# Patient Record
Sex: FEMALE | Race: WHITE | NOT HISPANIC OR LATINO | Employment: UNEMPLOYED | URBAN - METROPOLITAN AREA
[De-identification: names, ages, dates, MRNs, and addresses within clinical notes are randomized per-mention and may not be internally consistent; named-entity substitution may affect disease eponyms.]

---

## 2017-02-02 DIAGNOSIS — J45.30 MILD PERSISTENT ASTHMA WITHOUT COMPLICATION: Primary | ICD-10-CM

## 2017-02-02 RX ORDER — LORATADINE 10 MG/1
10 TABLET ORAL DAILY
Qty: 30 TABLET | Refills: 11 | Status: SHIPPED | OUTPATIENT
Start: 2017-02-02

## 2017-02-02 NOTE — TELEPHONE ENCOUNTER
Request for medication refill:    Date of last visit at clinic: 12/20/16    Please complete refill if appropriate and CLOSE ENCOUNTER.    Closing the encounter signifies the refill is complete.    If refill has been denied, please complete the smart phrase .smirefuse and route it to the [facility] RN TRIAGE pool to inform the patient and the pharmacy.    Marya Ramirez

## 2017-02-13 DIAGNOSIS — K21.9 GASTROESOPHAGEAL REFLUX DISEASE WITHOUT ESOPHAGITIS: ICD-10-CM

## 2017-02-13 DIAGNOSIS — F33.1 MAJOR DEPRESSIVE DISORDER, RECURRENT EPISODE, MODERATE (H): ICD-10-CM

## 2017-02-13 NOTE — TELEPHONE ENCOUNTER
Request for medication refill:    Date of last visit at clinic: 12-20-16    Please complete refill if appropriate and CLOSE ENCOUNTER.    Closing the encounter signifies the refill is complete.    If refill has been denied, please complete the smart phrase .smirefuse and route it to the [facility] RN TRIAGE pool to inform the patient and the pharmacy.    Nevin Maynard

## 2017-02-14 ENCOUNTER — OFFICE VISIT (OUTPATIENT)
Dept: FAMILY MEDICINE | Facility: CLINIC | Age: 42
End: 2017-02-14

## 2017-02-14 VITALS
SYSTOLIC BLOOD PRESSURE: 125 MMHG | HEART RATE: 84 BPM | TEMPERATURE: 98.5 F | HEIGHT: 68 IN | DIASTOLIC BLOOD PRESSURE: 83 MMHG | BODY MASS INDEX: 43.59 KG/M2 | RESPIRATION RATE: 18 BRPM | WEIGHT: 287.6 LBS | OXYGEN SATURATION: 97 %

## 2017-02-14 DIAGNOSIS — K21.9 GASTROESOPHAGEAL REFLUX DISEASE WITHOUT ESOPHAGITIS: ICD-10-CM

## 2017-02-14 DIAGNOSIS — F33.1 MAJOR DEPRESSIVE DISORDER, RECURRENT EPISODE, MODERATE (H): ICD-10-CM

## 2017-02-14 RX ORDER — OMEPRAZOLE 40 MG/1
40 CAPSULE, DELAYED RELEASE ORAL DAILY
Qty: 30 CAPSULE | Refills: 5 | Status: SHIPPED | OUTPATIENT
Start: 2017-02-14 | End: 2017-11-15

## 2017-02-14 RX ORDER — OMEPRAZOLE 40 MG/1
40 CAPSULE, DELAYED RELEASE ORAL DAILY
Qty: 30 CAPSULE | Refills: 1 | Status: SHIPPED | OUTPATIENT
Start: 2017-02-14 | End: 2017-11-06

## 2017-02-14 RX ORDER — ESCITALOPRAM OXALATE 20 MG/1
20 TABLET ORAL DAILY
Qty: 30 TABLET | Refills: 5 | Status: SHIPPED | OUTPATIENT
Start: 2017-02-14

## 2017-02-14 RX ORDER — ESCITALOPRAM OXALATE 20 MG/1
20 TABLET ORAL DAILY
Qty: 30 TABLET | Refills: 1 | Status: SHIPPED | OUTPATIENT
Start: 2017-02-14

## 2017-02-14 ASSESSMENT — PATIENT HEALTH QUESTIONNAIRE - PHQ9: 5. POOR APPETITE OR OVEREATING: SEVERAL DAYS

## 2017-02-14 ASSESSMENT — ENCOUNTER SYMPTOMS
RHINORRHEA: 0
NAUSEA: 0
CONSTIPATION: 0
NERVOUS/ANXIOUS: 1
WEAKNESS: 0
PALPITATIONS: 0
CHILLS: 0
EYES NEGATIVE: 1
ABDOMINAL PAIN: 0
SORE THROAT: 0
DYSPHORIC MOOD: 0
DIARRHEA: 0
HEADACHES: 0
FEVER: 0
DIZZINESS: 0
SHORTNESS OF BREATH: 0

## 2017-02-14 ASSESSMENT — ANXIETY QUESTIONNAIRES
3. WORRYING TOO MUCH ABOUT DIFFERENT THINGS: NOT AT ALL
GAD7 TOTAL SCORE: 2
1. FEELING NERVOUS, ANXIOUS, OR ON EDGE: SEVERAL DAYS
6. BECOMING EASILY ANNOYED OR IRRITABLE: NOT AT ALL
5. BEING SO RESTLESS THAT IT IS HARD TO SIT STILL: NOT AT ALL
IF YOU CHECKED OFF ANY PROBLEMS ON THIS QUESTIONNAIRE, HOW DIFFICULT HAVE THESE PROBLEMS MADE IT FOR YOU TO DO YOUR WORK, TAKE CARE OF THINGS AT HOME, OR GET ALONG WITH OTHER PEOPLE: NOT DIFFICULT AT ALL
2. NOT BEING ABLE TO STOP OR CONTROL WORRYING: NOT AT ALL
7. FEELING AFRAID AS IF SOMETHING AWFUL MIGHT HAPPEN: NOT AT ALL

## 2017-02-14 NOTE — PATIENT INSTRUCTIONS
Here is the plan from today's visit    1. Gastroesophageal reflux disease without esophagitis  - omeprazole (PRILOSEC) 40 MG capsule; Take 1 capsule (40 mg) by mouth daily 1 a day  Dispense: 30 capsule; Refill: 5    2. Major depressive disorder, recurrent episode, moderate (H)  - escitalopram (LEXAPRO) 20 MG tablet; Take 1 tablet (20 mg) by mouth daily  Dispense: 30 tablet; Refill: 5    Please call or return to clinic if your symptoms don't go away.    Follow up plan  Please make a clinic appointment for follow up with me (TASNEEM ROSALES) in 6 months or sooner if worsening symptoms.     Thank you for coming to [facility] today.  Lab Testing:  **If you had lab testing today and your results are reassuring or normal they will be mailed to you or sent through [employer] within 7 days.   **If the lab tests need quick action we will call you with the results.  The phone number we will call with results is # [phone] (home) [phone] (work). If this is not the best number please call our clinic and change the number.  Medication Refills:  If you need any refills please call your pharmacy and they will contact us.   If you need to  your refill at a new pharmacy, please contact the new pharmacy directly. The new pharmacy will help you get your medications transferred faster.   Scheduling:  If you have any concerns about today's visit or wish to schedule another appointment please call our office during normal business hours [phone] (8-5:00 M-F)  If a referral was made to a [facility] and you don't get a call from central scheduling please call [phone].  If a Mammogram was ordered for you at [facility] call [phone] to schedule or change your appointment.  If you had an XRay/CT/Ultrasound/MRI ordered the number is [phone] to schedule or change your radiology appointment.   Medical Concerns:  If you have urgent medical concerns please call [phone] at  any time of the day.

## 2017-02-14 NOTE — PROGRESS NOTES
Preceptor Attestation:   Patient seen and discussed with the resident. Assessment and plan reviewed with resident and agreed upon.   Supervising Physician:  Racheal Westfall MD  [facility]

## 2017-02-14 NOTE — PROGRESS NOTES
"      HPI:       Mackenzie Felix is a 41 year old who presents for the following  Patient presents with:  Refill Request: pt requesting for more 3 supply of medication      Depression/Anxiety  Follow-Up    Status since last visit: No change    What is going well? 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 Never  Are you able to follow the treatment plan? Yes  Exercise: walking 2-3 days/week for an average of 15-30 minutes     Problem, Medication and Allergy Lists were reviewed and are current.  Patient is an established patient of this clinic.         Review of Systems:   Review of Systems   Constitutional: Negative for chills and fever.   HENT: Negative for congestion, ear pain, rhinorrhea and sore throat.    Eyes: Negative.    Respiratory: Negative for shortness of breath.    Cardiovascular: Negative for chest pain and palpitations.   Gastrointestinal: Negative for abdominal pain, constipation, diarrhea and nausea.   Neurological: Negative for dizziness, weakness and headaches.   Psychiatric/Behavioral: Negative for behavioral problems, dysphoric mood and suicidal ideas. The patient is nervous/anxious (sometimes she becomes anxious).              Physical Exam:     Patient Vitals for the past 24 hrs:   BP Temp Temp src Pulse Resp SpO2 Height Weight   02/14/17 1343 125/83 98.5  F (36.9  C) Oral 84 18 97 % 5' 8\" (172.7 cm) 287 lb 9.6 oz (130.5 kg)     Body mass index is 43.73 " kg/(m^2).  Vitals were reviewed and were normal except elevated BMI in morbid obesity      Physical Exam   Constitutional: She appears well-developed and well-nourished. No distress.   HENT:   Head: Normocephalic and atraumatic.   Eyes: Conjunctivae and EOM are normal.   Cardiovascular: Normal rate, regular rhythm and normal heart sounds.    No murmur heard.  Pulmonary/Chest: Effort normal and breath sounds normal. She has no wheezes. She has no rales.   Abdominal: Soft. She exhibits no distension. There is no tenderness.   Skin: Skin is warm and dry. She is not diaphoretic.   Psychiatric: She has a normal mood and affect. Her behavior is normal.         Results:      Results from the last 24 hoursNo results found for this or any previous visit (from the past 24 hour(s)).  Assessment and Plan     Mackenzie was seen today for refill request.    Diagnoses and all orders for this visit:    Major depressive disorder, recurrent episode, moderate (H)- Controlled  PHQ-9 score of 2 and MICHAEL-7 scores of 2. No suicidal or homicidal ideation. Follow up in 6 months or sooner for worsening or new symptoms.  -     escitalopram (LEXAPRO) 20 MG tablet; Take 1 tablet (20 mg) by mouth daily    Gastroesophageal reflux disease without esophagitis-Controlled  -     omeprazole (PRILOSEC) 40 MG capsule; Take 1 capsule (40 mg) by mouth daily 1 a day    Health maintenance: Patient was in a hurry and did not want to discuss health maintenance. Next appointment should address morbid obesity.    Medications Discontinued During This Encounter   Medication Reason     omeprazole (PRILOSEC) 40 MG capsule Reorder     escitalopram (LEXAPRO) 20 MG tablet Reorder     Options for treatment and follow-up care were reviewed with the patient. Mackenzie Felix  engaged in the decision making process and verbalized understanding of the options discussed and agreed with the final plan.    Mariah Zhang MD

## 2017-02-14 NOTE — MR AVS SNAPSHOT
After Visit Summary   2/14/2017    Mackenzie Felix    MRN: [id number]           Patient Information     Date Of Birth          1975        Visit Information        Provider Department      2/14/2017 1:40 PM Tasneem Rosales MD [facility]        Today's Diagnoses     Gastroesophageal reflux disease without esophagitis        Major depressive disorder, recurrent episode, moderate (H)          Care Instructions    Here is the plan from today's visit    1. Gastroesophageal reflux disease without esophagitis  - omeprazole (PRILOSEC) 40 MG capsule; Take 1 capsule (40 mg) by mouth daily 1 a day  Dispense: 30 capsule; Refill: 5    2. Major depressive disorder, recurrent episode, moderate (H)  - escitalopram (LEXAPRO) 20 MG tablet; Take 1 tablet (20 mg) by mouth daily  Dispense: 30 tablet; Refill: 5    Please call or return to clinic if your symptoms don't go away.    Follow up plan  Please make a clinic appointment for follow up with me (TASNEEM ROSALES) in 6 months or sooner if worsening symptoms.     Thank you for coming to [facility] today.  Lab Testing:  **If you had lab testing today and your results are reassuring or normal they will be mailed to you or sent through [employer] within 7 days.   **If the lab tests need quick action we will call you with the results.  The phone number we will call with results is # [phone] (home) [phone] (work). If this is not the best number please call our clinic and change the number.  Medication Refills:  If you need any refills please call your pharmacy and they will contact us.   If you need to  your refill at a new pharmacy, please contact the new pharmacy directly. The new pharmacy will help you get your medications transferred faster.   Scheduling:  If you have any concerns about today's visit or wish to schedule another appointment please call our office during normal business hours [phone] (8-5:00  M-F)  If a referral was made to a [facility] and you don't get a call from central scheduling please call [phone].  If a Mammogram was ordered for you at [facility] call [phone] to schedule or change your appointment.  If you had an XRay/CT/Ultrasound/MRI ordered the number is [phone] to schedule or change your radiology appointment.   Medical Concerns:  If you have urgent medical concerns please call [phone] at any time of the day.          Follow-ups after your visit        Who to contact     Please call your clinic at [phone] to:    Ask questions about your health    Make or cancel appointments    Discuss your medicines    Learn about your test results    Speak to your doctor   If you have compliments or concerns about an experience at your clinic, or if you wish to file a complaint, please contact [facility] Patient Relations at [phone] or email us at [email or web address]         Additional Information About Your Visit        [employer] Information     [employer] is an electronic gateway that provides easy, online access to your medical records. With [employer], you can request a clinic appointment, read your test results, renew a prescription or communicate with your care team.     To sign up for [employer] visit the website at [email or web address]   You will be asked to enter the access code listed below, as well as some personal information. Please follow the directions to create your username and password.     Your access code is: [email or web address]  Expires: 3/28/2017  6:30 AM     Your access code will  in 90 days. If you need help or a new code, please contact your [facility] or call [phone] for assistance.        Care EveryWhere ID     This is your Care EveryWhere ID. This could be used by other organizations to access your [place] medical records  [id number]        Your  "Vitals Were     Pulse Temperature Respirations Height Pulse Oximetry BMI (Body Mass Index)    84 98.5  F (36.9  C) (Oral) 18 5' 8\" (172.7 cm) 97% 43.73 kg/m2       Blood Pressure from Last 3 Encounters:   02/14/17 125/83   12/20/16 118/80   10/14/16 113/75    Weight from Last 3 Encounters:   02/14/17 287 lb 9.6 oz (130.5 kg)   12/20/16 282 lb 6.4 oz (128.1 kg)   10/14/16 275 lb 4 oz (124.9 kg)              Today, you had the following     No orders found for display         Where to get your medicines      These medications [place] [facility] [place] [address]  [address], [place] [address]     Phone:  [phone]     escitalopram 20 MG tablet    omeprazole 40 MG capsule          Primary Care Provider Office Phone # Fax #    Donte Meraz,  [phone]       [facility] [address] [facility] [address]        Thank you!     Thank you for choosing [facility]  for your care. Our goal is always to provide you with excellent care. Hearing back from our patients is one way we can continue to improve our services. Please take a few minutes to complete the written survey that you may receive in the mail after your visit with us. Thank you!             Your Updated Medication List - Protect others around you: Learn how to safely use, store and throw away your medicines at www.disposemymeds.org.          This list is accurate as of: 2/14/17  2:00 PM.  Always use your most recent med list.                   Brand Name Dispense Instructions for use    ACETAMINOPHEN PO      Take 500 Act by mouth       albuterol 108 (90 BASE) MCG/ACT Inhaler    PROAIR HFA/PROVENTIL HFA/VENTOLIN HFA    1 Inhaler    Inhale 2 puffs into the lungs every 4 hours as needed for shortness of breath / dyspnea or wheezing       B-12 1000 MCG Subl      Place 1,000 mcg under the tongue daily Place 1 tab under tongue and dissolve daily       blood glucose " monitoring test strip    no brand specified    200 each    Use to test blood sugars 3 times daily or as directed.       calcium carbonate 500 MG chewable tablet    TUMS     Take 1 chew tab by mouth daily       clotrimazole 1 % cream    LOTRIMIN     Apply topically 2 times daily       escitalopram 20 MG tablet    LEXAPRO    30 tablet    Take 1 tablet (20 mg) by mouth daily       glucose 4 G Chew chewable tablet      Take 4 tablets by mouth as needed       hydrocortisone 1 % cream    CORTAID     Apply 1 drop topically 2 times daily       HYDROXYZINE HCL PO      Take 1 capsule by mouth 1 cap a day       loratadine 10 MG tablet    CLARITIN    30 tablet    Take 1 tablet (10 mg) by mouth daily       MEDROXYPROGESTERONE ACETATE PO      Take 1 Dose by mouth every 3 months       MELATONIN PO      Take 10 mg by mouth At Bedtime       NONFORMULARY      14 mg daily Health Sinus PE & Allergy       omeprazole 40 MG capsule    priLOSEC    30 capsule    Take 1 capsule (40 mg) by mouth daily 1 a day       ORAJEL MT          VITAMIN D (CHOLECALCIFEROL) PO      Take 1,000 Units by mouth daily Take 5 tabs by mouth daily

## 2017-02-15 ASSESSMENT — ANXIETY QUESTIONNAIRES: GAD7 TOTAL SCORE: 2

## 2017-02-15 ASSESSMENT — PATIENT HEALTH QUESTIONNAIRE - PHQ9: SUM OF ALL RESPONSES TO PHQ QUESTIONS 1-9: 2

## 2017-03-27 ENCOUNTER — OFFICE VISIT (OUTPATIENT)
Dept: FAMILY MEDICINE | Facility: CLINIC | Age: 42
End: 2017-03-27

## 2017-03-27 VITALS
TEMPERATURE: 98.1 F | OXYGEN SATURATION: 96 % | HEART RATE: 98 BPM | WEIGHT: 282 LBS | RESPIRATION RATE: 18 BRPM | SYSTOLIC BLOOD PRESSURE: 127 MMHG | DIASTOLIC BLOOD PRESSURE: 84 MMHG | BODY MASS INDEX: 42.88 KG/M2

## 2017-03-27 DIAGNOSIS — E16.2 HYPOGLYCEMIA: Primary | ICD-10-CM

## 2017-03-27 NOTE — MR AVS SNAPSHOT
After Visit Summary   3/27/2017    Mackenzie Felix    MRN: [id number]           Patient Information     Date Of Birth          1975        Visit Information        Provider Department      3/27/2017 2:20 PM Donte Meraz DO [facility]         Follow-ups after your visit        Who to contact     Please call your clinic at [phone] to:    Ask questions about your health    Make or cancel appointments    Discuss your medicines    Learn about your test results    Speak to your doctor   If you have compliments or concerns about an experience at your clinic, or if you wish to file a complaint, please contact [facility] Patient Relations at [phone] or email us at [email or web address]         Additional Information About Your Visit        MyChart Information     [employer] is an electronic gateway that provides easy, online access to your medical records. With [employer], you can request a clinic appointment, read your test results, renew a prescription or communicate with your care team.     To sign up for [employer] visit the website at [email or web address]   You will be asked to enter the access code listed below, as well as some personal information. Please follow the directions to create your username and password.     Your access code is: [email or web address]  Expires: 3/28/2017  7:30 AM     Your access code will  in 90 days. If you need help or a new code, please contact your [facility] or call [phone] for assistance.        Care EveryWhere ID     This is your Care EveryWhere ID. This could be used by other organizations to access your [place] medical records  [id number]        Your Vitals Were     Pulse Temperature Respirations Pulse Oximetry Breastfeeding? BMI (Body Mass Index)    98 98.1  F (36.7  C) (Oral) 18 96% No 42.88 kg/m2       Blood Pressure from Last 3 Encounters:   17  127/84   02/14/17 125/83   12/20/16 118/80    Weight from Last 3 Encounters:   03/27/17 282 lb (127.9 kg)   02/14/17 287 lb 9.6 oz (130.5 kg)   12/20/16 282 lb 6.4 oz (128.1 kg)              Today, you had the following     No orders found for display       Primary Care Provider Office Phone # Fax #    Donte Meraz [phone]       [facility] [address] [facility] [address]        Thank you!     Thank you for choosing [facility]  for your care. Our goal is always to provide you with excellent care. Hearing back from our patients is one way we can continue to improve our services. Please take a few minutes to complete the written survey that you may receive in the mail after your visit with us. Thank you!             Your Updated Medication List - Protect others around you: Learn how to safely use, store and throw away your medicines at www.disposemymeds.org.          This list is accurate as of: 3/27/17  2:45 PM.  Always use your most recent med list.                   Brand Name Dispense Instructions for use    ACETAMINOPHEN PO      Take 500 Act by mouth       albuterol 108 (90 BASE) MCG/ACT Inhaler    PROAIR HFA/PROVENTIL HFA/VENTOLIN HFA    1 Inhaler    Inhale 2 puffs into the lungs every 4 hours as needed for shortness of breath / dyspnea or wheezing       B-12 1000 MCG Subl      Place 1,000 mcg under the tongue daily Place 1 tab under tongue and dissolve daily       blood glucose monitoring test strip    no brand specified    200 each    Use to test blood sugars 3 times daily or as directed.       calcium carbonate 500 MG chewable tablet    TUMS     Take 1 chew tab by mouth daily       clotrimazole 1 % cream    LOTRIMIN     Apply topically 2 times daily       * escitalopram 20 MG tablet    LEXAPRO    30 tablet    Take 1 tablet (20 mg) by mouth daily       * escitalopram 20 MG tablet    LEXAPRO    30 tablet    Take 1 tablet (20 mg) by mouth daily       glucose 4  G Chew chewable tablet      Take 4 tablets by mouth as needed       hydrocortisone 1 % cream    CORTAID     Apply 1 drop topically 2 times daily       HYDROXYZINE HCL PO      Take 1 capsule by mouth 1 cap a day       loratadine 10 MG tablet    CLARITIN    30 tablet    Take 1 tablet (10 mg) by mouth daily       MEDROXYPROGESTERONE ACETATE PO      Take 1 Dose by mouth every 3 months       MELATONIN PO      Take 10 mg by mouth At Bedtime       NONFORMULARY      14 mg daily Health Sinus PE & Allergy       * omeprazole 40 MG capsule    priLOSEC    30 capsule    Take 1 capsule (40 mg) by mouth daily 1 a day       * omeprazole 40 MG capsule    priLOSEC    30 capsule    Take 1 capsule (40 mg) by mouth daily 1 a day       ORAJEL MT          VITAMIN D (CHOLECALCIFEROL) PO      Take 1,000 Units by mouth daily Take 5 tabs by mouth daily       * Notice:  This list has 4 medication(s) that are the same as other medications prescribed for you. Read the directions carefully, and ask your doctor or other care provider to review them with you.

## 2017-03-27 NOTE — PROGRESS NOTES
HPI:       Mackenzie Felix is a 41 year old who presents for the following  Patient presents with:  Forms    Has not been having hypoglycemia in several months.  She has a history of taking     She is currently only checking her blood sugar when she is having symptoms. Has not needed to chevck in 6 weeks. Last time she checked it was 53 before lunch. She reports being symptomatic at the time and did not pass out because she took some glucose tabs.  The problem, per endocrinology has been low blood sugar caused by a spike in insulin after a carb heavy meal s/p gastric bypass.  Does not have problems after eating a carb light/ protein heavy meal.        Problem, Medication and Allergy Lists were reviewed and are current.  Patient is an established patient of this clinic.         Review of Systems:   Review of Systems   Constitutional: Negative for chills and fever.   Respiratory: Negative for shortness of breath.    Cardiovascular: Negative for chest pain.   Gastrointestinal: Negative for constipation, diarrhea, nausea and vomiting.   Neurological: Negative for dizziness, light-headedness and headaches.   Psychiatric/Behavioral: Negative for dysphoric mood.             Physical Exam:   Patient Vitals for the past 24 hrs:   BP Temp Temp src Pulse Resp SpO2 Weight   03/27/17 1418 127/84 98.1  F (36.7  C) Oral 98 18 96 % 282 lb (127.9 kg)     Body mass index is 42.88 kg/(m^2).  Vitals were reviewed and were normal     Physical Exam   Constitutional: She appears well-developed.   HENT:   Head: Normocephalic and atraumatic.   Eyes: Conjunctivae are normal.   Pulmonary/Chest: No respiratory distress.   Skin: Skin is warm and dry. No erythema.   Psychiatric: She has a normal mood and affect. Her behavior is normal. Thought content normal.         Results:     No investigations ordered today    Assessment and Plan     1. Hypoglycemia  Pt needs a form for DMV.  She has made adaptations to her diet and does not drink  anymore.  She is in long term treatment and will continue in the program for the foreseeable future.  She has glucose tabs with her at all times. Hypoglycemia is caused from high carb meal in morning with a history of gastric bypass surgery.      There are no discontinued medications.  Options for treatment and follow-up care were reviewed with the patient. Mackenzie e[employer]ix  engaged in the decision making process and verbalized understanding of the options discussed and agreed with the final plan.    Donte Meraz, DO

## 2017-03-27 NOTE — PROGRESS NOTES
Preceptor Attestation:   Patient seen and discussed with the resident.   Assessment and plan reviewed with resident and agreed upon.   Supervising Physician:  Nickolas Nelson MD  [facility]

## 2017-03-31 ASSESSMENT — ENCOUNTER SYMPTOMS
SHORTNESS OF BREATH: 0
NAUSEA: 0
DYSPHORIC MOOD: 0
CONSTIPATION: 0
VOMITING: 0
CHILLS: 0
DIARRHEA: 0
LIGHT-HEADEDNESS: 0
FEVER: 0
HEADACHES: 0
DIZZINESS: 0

## 2017-06-06 DIAGNOSIS — J18.9 PNEUMONIA DUE TO INFECTIOUS ORGANISM, UNSPECIFIED LATERALITY, UNSPECIFIED PART OF LUNG: ICD-10-CM

## 2017-06-06 NOTE — TELEPHONE ENCOUNTER
PATIENT IS OUT OF MEDICATION    [facility] phone call message- patient requesting a refill:    Full Medication Name: albuterol (PROAIR HFA, PROVENTIL HFA, VENTOLIN HFA) 108 (90 BASE) MCG/ACT inhaler        Pharmacy confirmed as     [facility] - [address] [address]  Phone: [phone] Fax: [phone]  : Yes    Additional Comments: Patient is out of medication and states that she really needs this medication due to the weather.      OK to leave a message on voice mail? Yes    Primary language: English      needed? No    Call taken on June 6, 2017 at 9:30 AM by Nadine Pozo

## 2017-06-06 NOTE — TELEPHONE ENCOUNTER
Message routed to PCP at high priority to refill if appropriate. Last office visit 3/27/17    Kristen Ferrer RN

## 2017-06-07 NOTE — TELEPHONE ENCOUNTER
PT returned call.     Pt sts she has been having more difficulty breathing due to the seasonal changes, but does not feel like she is in overwhelming distress. Pt was speaking evenly in full sentences and does not appear to be in respiratory distress. I advised Pt that she should make an appointment to come into the clinic to ensure that our treatment plan is working for her since she has had to use her inhaler more frequently. I also advise that we would continue to wait for Dr. Meraz to authorize the refill and that if she felt like she couldn't wait any longer, then she should go to the ER. Pt agrees and was transferred to the  for scheduling.     Deny Vieira RN

## 2017-06-07 NOTE — TELEPHONE ENCOUNTER
Patient is calling to check on the status. She states she is almost out and really needs her inhaler.

## 2017-06-07 NOTE — TELEPHONE ENCOUNTER
Attempted to call patient to discuss symptoms and determine if visit is need to adjust controller medications to reduce reliance on rescue inhaler. No answer, message left to return call.     Routing to RN Triage Pool to follow up.     Deny Vieira RN

## 2017-06-09 RX ORDER — ALBUTEROL SULFATE 90 UG/1
2 AEROSOL, METERED RESPIRATORY (INHALATION) EVERY 4 HOURS PRN
Qty: 1 INHALER | Refills: 3 | Status: SHIPPED | OUTPATIENT
Start: 2017-06-09

## 2017-06-09 NOTE — TELEPHONE ENCOUNTER
The patient called to get the status of the refill.  I advised not filled yet and will send message again.  Pt is scheduled to see the doctor next week.  Please refill.

## 2017-06-16 ENCOUNTER — OFFICE VISIT (OUTPATIENT)
Dept: FAMILY MEDICINE | Facility: CLINIC | Age: 42
End: 2017-06-16

## 2017-06-16 VITALS
DIASTOLIC BLOOD PRESSURE: 79 MMHG | BODY MASS INDEX: 41.51 KG/M2 | SYSTOLIC BLOOD PRESSURE: 120 MMHG | WEIGHT: 273 LBS | TEMPERATURE: 98.3 F | RESPIRATION RATE: 22 BRPM | OXYGEN SATURATION: 95 % | HEART RATE: 85 BPM

## 2017-06-16 DIAGNOSIS — J45.31 MILD PERSISTENT ALLERGIC ASTHMA WITH ACUTE EXACERBATION: ICD-10-CM

## 2017-06-16 DIAGNOSIS — G47.33 OSA (OBSTRUCTIVE SLEEP APNEA): Primary | ICD-10-CM

## 2017-06-16 RX ORDER — ALBUTEROL SULFATE 90 UG/1
2 AEROSOL, METERED RESPIRATORY (INHALATION) EVERY 6 HOURS PRN
Qty: 1 INHALER | Refills: 3 | Status: SHIPPED | OUTPATIENT
Start: 2017-06-16

## 2017-06-16 RX ORDER — PREDNISONE 20 MG/1
40 TABLET ORAL DAILY
Qty: 10 TABLET | Refills: 0 | Status: SHIPPED | OUTPATIENT
Start: 2017-06-16 | End: 2017-06-21

## 2017-06-16 ASSESSMENT — ENCOUNTER SYMPTOMS
SORE THROAT: 0
NAUSEA: 0
VOMITING: 0
WHEEZING: 1
DIZZINESS: 0
DYSPHORIC MOOD: 0
SHORTNESS OF BREATH: 1
DIFFICULTY URINATING: 0
LIGHT-HEADEDNESS: 0
HEADACHES: 0
COUGH: 1
DIARRHEA: 0
CHEST TIGHTNESS: 1
CHILLS: 0
CONSTIPATION: 0
FEVER: 0

## 2017-06-16 NOTE — MR AVS SNAPSHOT
After Visit Summary   6/16/2017    Mackenzie Felix    MRN: [id number]           Patient Information     Date Of Birth          1975        Visit Information        Provider Department      6/16/2017 1:00 PM Donte Meraz DO [facility]        Today's Diagnoses     BENTON (obstructive sleep apnea)    -  1    Mild persistent allergic asthma with acute exacerbation          Care Instructions    Here is the plan from today's visit    1. BENTON (obstructive sleep apnea)  Go to [employer].  I will give you a list.  If you are having trouble getting one, call our clinic and ask for a care coordinator.   - order for DME; New cpap mask  Dispense: 1 each; Refill: 0    2. Mild persistent allergic asthma with acute exacerbation  - albuterol (PROAIR HFA/PROVENTIL HFA/VENTOLIN HFA) 108 (90 BASE) MCG/ACT Inhaler; Inhale 2 puffs into the lungs every 6 hours as needed for shortness of breath / dyspnea or wheezing  Dispense: 1 Inhaler; Refill: 3  - fluticasone (FLOVENT DISKUS) 250 MCG/BLIST AEPB Inhaler; Inhale 2 puffs into the lungs 2 times daily  Dispense: 3 Inhaler; Refill: 1  - predniSONE (DELTASONE) 20 MG tablet; Take 2 tablets (40 mg) by mouth daily for 5 days  Dispense: 10 tablet; Refill: 0    Plantar fasciitis  Use your insoles as often as possible.  Return in 1 month.        Please call or return to clinic if your symptoms don't go away.    Follow up plan  Return in 1 month     Thank you for coming to [facility] today.  Lab Testing:  **If you had lab testing today and your results are reassuring or normal they will be mailed to you or sent through [employer] within 7 days.   **If the lab tests need quick action we will call you with the results.  The phone number we will call with results is # [phone] (home) [phone] (work). If this is not the best number please call our clinic and change the number.  Medication Refills:  If you need any refills please call your  pharmacy and they will contact us.   If you need to  your refill at a new pharmacy, please contact the new pharmacy directly. The new pharmacy will help you get your medications transferred faster.   Scheduling:  If you have any concerns about today's visit or wish to schedule another appointment please call our office during normal business hours [phone] (8-5:00 M-F)  If a referral was made to a [facility] and you don't get a call from central scheduling please call [phone].  If a Mammogram was ordered for you at [facility] call [phone] to schedule or change your appointment.  If you had an XRay/CT/Ultrasound/MRI ordered the number is [phone] to schedule or change your radiology appointment.   Medical Concerns:  If you have urgent medical concerns please call [phone] at any time of the day.            Follow-ups after your visit        Who to contact     Please call your clinic at [phone] to:    Ask questions about your health    Make or cancel appointments    Discuss your medicines    Learn about your test results    Speak to your doctor   If you have compliments or concerns about an experience at your clinic, or if you wish to file a complaint, please contact [facility] Patient Relations at [phone] or email us at [email or web address]         Additional Information About Your Visit        MyChart Information     [employer] is an electronic gateway that provides easy, online access to your medical records. With [employer], you can request a clinic appointment, read your test results, renew a prescription or communicate with your care team.     To sign up for [employer] visit the website at [email or web address]   You will be asked to enter the access code listed below, as well as some personal information. Please follow the directions to create your username and password.     Your access code is:  [email or web address]  Expires: 2017  1:31 PM     Your access code will  in 90 days. If you need help or a new code, please contact your [facility] or call [phone] for assistance.        Care EveryWhere ID     This is your Care EveryWhere ID. This could be used by other organizations to access your [facility] medical records  [id number]        Your Vitals Were     Pulse Temperature Respirations Pulse Oximetry Breastfeeding? BMI (Body Mass Index)    85 98.3  F (36.8  C) (Oral) 22 95% No 41.51 kg/m2       Blood Pressure from Last 3 Encounters:   17 120/79   17 127/84   17 125/83    Weight from Last 3 Encounters:   17 273 lb (123.8 kg)   17 282 lb (127.9 kg)   17 287 lb 9.6 oz (130.5 kg)              Today, you had the following     No orders found for display         Today's Medication Changes          These changes are accurate as of: 17  1:31 PM.  If you have any questions, ask your nurse or doctor.               Start taking these medicines.        Dose/Directions    fluticasone 250 MCG/BLIST Aepb Inhaler   Commonly known as:  FLOVENT DISKUS   Used for:  Mild persistent allergic asthma with acute exacerbation   Started by:  Donte Meraz DO        Dose:  2 puff   Inhale 2 puffs into the lungs 2 times daily   Quantity:  3 Inhaler   Refills:  1       order for DME   Used for:  BENTON (obstructive sleep apnea)   Started by:  Donte Meraz DO        New cpap mask   Quantity:  1 each   Refills:  0       predniSONE 20 MG tablet   Commonly known as:  DELTASONE   Used for:  Mild persistent allergic asthma with acute exacerbation   Started by:  Donte Meraz DO        Dose:  40 mg   Take 2 tablets (40 mg) by mouth daily for 5 days   Quantity:  10 tablet   Refills:  0         These medicines have changed or have updated prescriptions.        Dose/Directions    * albuterol 108 (90 BASE) MCG/ACT Inhaler   Commonly known as:  PROAIR HFA/PROVENTIL  HFA/VENTOLIN HFA   This may have changed:  Another medication with the same name was added. Make sure you understand how and when to take each.   Used for:  Pneumonia due to infectious organism, unspecified laterality, unspecified part of lung   Changed by:  Donte Meraz DO        Dose:  2 puff   Inhale 2 puffs into the lungs every 4 hours as needed for shortness of breath / dyspnea or wheezing   Quantity:  1 Inhaler   Refills:  3       * albuterol 108 (90 BASE) MCG/ACT Inhaler   Commonly known as:  PROAIR HFA/PROVENTIL HFA/VENTOLIN HFA   This may have changed:  You were already taking a medication with the same name, and this prescription was added. Make sure you understand how and when to take each.   Used for:  Mild persistent allergic asthma with acute exacerbation   Changed by:  Donte Meraz DO        Dose:  2 puff   Inhale 2 puffs into the lungs every 6 hours as needed for shortness of breath / dyspnea or wheezing   Quantity:  1 Inhaler   Refills:  3       * Notice:  This list has 2 medication(s) that are the same as other medications prescribed for you. Read the directions carefully, and ask your doctor or other care provider to review them with you.         Where to get your medicines      These medications were sent to [id number] - [place], [place] - [address] [place] [address] AT [facility] [address]  [address], [place] [address]     Phone:  [phone]     albuterol 108 (90 BASE) MCG/ACT Inhaler    fluticasone 250 MCG/BLIST Aepb Inhaler    predniSONE 20 MG tablet         Some of these will need a paper prescription and others can be bought over the counter.  Ask your nurse if you have questions.     Bring a paper prescription for each of these medications     order for [facility]                Primary Care Provider Office Phone # Fax #    Donte Meraz [phone]       [facility] [address]  [facility] [address]        Thank you!     Thank you for choosing [facility]  for your care. Our goal is always to provide you with excellent care. Hearing back from our patients is one way we can continue to improve our services. Please take a few minutes to complete the written survey that you may receive in the mail after your visit with us. Thank you!             Your Updated Medication List - Protect others around you: Learn how to safely use, store and throw away your medicines at www.disposemymeds.org.          This list is accurate as of: 6/16/17  1:31 PM.  Always use your most recent med list.                   Brand Name Dispense Instructions for use    ACETAMINOPHEN PO      Take 500 Act by mouth       * albuterol 108 (90 BASE) MCG/ACT Inhaler    PROAIR HFA/PROVENTIL HFA/VENTOLIN HFA    1 Inhaler    Inhale 2 puffs into the lungs every 4 hours as needed for shortness of breath / dyspnea or wheezing       * albuterol 108 (90 BASE) MCG/ACT Inhaler    PROAIR HFA/PROVENTIL HFA/VENTOLIN HFA    1 Inhaler    Inhale 2 puffs into the lungs every 6 hours as needed for shortness of breath / dyspnea or wheezing       B-12 1000 MCG Subl      Place 1,000 mcg under the tongue daily Place 1 tab under tongue and dissolve daily       blood glucose monitoring test strip    no brand specified    200 each    Use to test blood sugars 3 times daily or as directed.       calcium carbonate 500 MG chewable tablet    TUMS     Take 1 chew tab by mouth daily       clotrimazole 1 % cream    LOTRIMIN     Apply topically 2 times daily       * escitalopram 20 MG tablet    LEXAPRO    30 tablet    Take 1 tablet (20 mg) by mouth daily       * escitalopram 20 MG tablet    LEXAPRO    30 tablet    Take 1 tablet (20 mg) by mouth daily       fluticasone 250 MCG/BLIST Aepb Inhaler    FLOVENT DISKUS    3 Inhaler    Inhale 2 puffs into the lungs 2 times daily       glucose 4 G Chew chewable tablet      Take 4 tablets by mouth as needed       hydrocortisone 1 % cream    CORTAID     Apply 1 drop topically  2 times daily       HYDROXYZINE HCL PO      Take 1 capsule by mouth 1 cap a day       loratadine 10 MG tablet    CLARITIN    30 tablet    Take 1 tablet (10 mg) by mouth daily       MEDROXYPROGESTERONE ACETATE PO      Take 1 Dose by mouth every 3 months       MELATONIN PO      Take 10 mg by mouth At Bedtime       NONFORMULARY      14 mg daily [facility] All[facility]       * omeprazole 40 MG capsule    priLOSEC    30 capsule    Take 1 capsule (40 mg) by mouth daily 1 a day       * omeprazole 40 MG capsule    priLOSEC    30 capsule    Take 1 capsule (40 mg) by mouth daily 1 a day       ORAJEL MT          order for DME     1 each    New cpap mask       predniSONE 20 MG tablet    DELTASONE    10 tablet    Take 2 tablets (40 mg) by mouth daily for 5 days       VITAMIN D (CHOLECALCIFEROL) PO      Take 1,000 Units by mouth daily Take 5 tabs by mouth daily       * Notice:  This list has 6 medication(s) that are the same as other medications prescribed for you. Read the directions carefully, and ask your doctor or other care provider to review them with you.

## 2017-06-16 NOTE — PROGRESS NOTES
HPI:       Mackenzie Felix is a 42 year old who presents for the following  Patient presents with:  Asthma: Follow up and med refill  Musculoskeletal Problem: Left foot pain     Needs a new mask for her cpap.  Diagnosed 2005.  12 months ago had a sleep study. Needs a new mask.  It tore.      Asthma.  Lives with a lot of smokers now.  Used to have a nebulizer but she doesn't know where it is.  Albuterol rescue inhaler is all she is using.  Has been this way for the last month that she has had really tight breathing.  Working full time and everyone smokes and it is very [place]. Home people smoke.  Moved into new environment a month ago.  Was on advair in the past.  Doesn't remember if it helped. Asmacort in the past as well. No ER visits.      Plantar fasciitis.  Nurse at [facility] thought it was it.  First step is the worse.  Having trouble walking very much. Not wearing supportive shoes right now.  Walks with walking shoes.  Brand new inserts in the shoes.                 Problem, Medication and Allergy Lists were reviewed and are current.  Patient is an established patient of this clinic.         Review of Systems:   Review of Systems   Constitutional: Negative for chills and fever.   HENT: Negative for congestion and sore throat.    Eyes: Negative for visual disturbance.   Respiratory: Positive for cough, chest tightness, shortness of breath and wheezing.    Cardiovascular: Negative for chest pain.   Gastrointestinal: Negative for constipation, diarrhea, nausea and vomiting.   Genitourinary: Negative for difficulty urinating.   Musculoskeletal:        Left foot pain    Skin: Negative for rash.   Neurological: Negative for dizziness, light-headedness and headaches.   Psychiatric/Behavioral: Negative for dysphoric mood.             Physical Exam:   Patient Vitals for the past 24 hrs:   BP Temp Temp src Pulse Resp SpO2 Weight   06/16/17 1304 120/79 98.3  F (36.8  C) Oral 85 22 95 % 273 lb (123.8 kg)      Body mass index is 41.51 kg/(m^2).  Vitals were reviewed and were normal     Physical Exam   Constitutional: She is oriented to person, place, and time. She appears well-developed and well-nourished.   HENT:   Head: Normocephalic and atraumatic.   Eyes: Conjunctivae and EOM are normal.   Cardiovascular: Normal rate, regular rhythm and normal heart sounds.  Exam reveals no gallop and no friction rub.    No murmur heard.  Pulmonary/Chest: Effort normal. No respiratory distress. She has no wheezes. She has no rales.   Tight breath sounds bilaterally, decreased air entry   Abdominal: She exhibits no distension.   Musculoskeletal:   Left plantar fascia tenderness.   Neurological: She is alert and oriented to person, place, and time. No cranial nerve deficit.   Skin: Skin is warm and dry.   Psychiatric: She has a normal mood and affect. Her behavior is normal. Judgment and thought content normal.         Results:     No investigations ordered today    Assessment and Plan     Patient Instructions   Here is the plan from today's visit    1. BENTON (obstructive sleep apnea)  Go to [employer].  I will give you a list.  If you are having trouble getting one, call our clinic and ask for a care coordinator.   - order for DME; New cpap mask  Dispense: 1 each; Refill: 0    2. Mild persistent allergic asthma with acute exacerbation  - albuterol (PROAIR HFA/PROVENTIL HFA/VENTOLIN HFA) 108 (90 BASE) MCG/ACT Inhaler; Inhale 2 puffs into the lungs every 6 hours as needed for shortness of breath / dyspnea or wheezing  Dispense: 1 Inhaler; Refill: 3  - fluticasone (FLOVENT DISKUS) 250 MCG/BLIST AEPB Inhaler; Inhale 2 puffs into the lungs 2 times daily  Dispense: 3 Inhaler; Refill: 1  - predniSONE (DELTASONE) 20 MG tablet; Take 2 tablets (40 mg) by mouth daily for 5 days  Dispense: 10 tablet; Refill: 0    Plantar fasciitis  Use your insoles as often as possible.  Return in 1 month.        Please call or return to clinic if your  symptoms don't go away.    Follow up plan  Return in 1 month     There are no discontinued medications.  Options for treatment and follow-up care were reviewed with the patient. Mackenzie Felix  engaged in the decision making process and verbalized understanding of the options discussed and agreed with the final plan.    Donte Meraz, DO

## 2017-06-16 NOTE — PROGRESS NOTES
Preceptor Attestation:   Patient seen and discussed with the resident. Assessment and plan reviewed with resident and agreed upon.   Supervising Physician:  Noah Rogel MD  [facility]

## 2017-06-16 NOTE — PATIENT INSTRUCTIONS
Here is the plan from today's visit    1. BENTON (obstructive sleep apnea)  Go to [employer].  I will give you a list.  If you are having trouble getting one, call our clinic and ask for a care coordinator.   - order for DME; New cpap mask  Dispense: 1 each; Refill: 0    2. Mild persistent allergic asthma with acute exacerbation  - albuterol (PROAIR HFA/PROVENTIL HFA/VENTOLIN HFA) 108 (90 BASE) MCG/ACT Inhaler; Inhale 2 puffs into the lungs every 6 hours as needed for shortness of breath / dyspnea or wheezing  Dispense: 1 Inhaler; Refill: 3  - fluticasone (FLOVENT DISKUS) 250 MCG/BLIST AEPB Inhaler; Inhale 2 puffs into the lungs 2 times daily  Dispense: 3 Inhaler; Refill: 1  - predniSONE (DELTASONE) 20 MG tablet; Take 2 tablets (40 mg) by mouth daily for 5 days  Dispense: 10 tablet; Refill: 0    Plantar fasciitis  Use your insoles as often as possible.  Return in 1 month.        Please call or return to clinic if your symptoms don't go away.    Follow up plan  Return in 1 month     Thank you for coming to [facility] today.  Lab Testing:  **If you had lab testing today and your results are reassuring or normal they will be mailed to you or sent through [employer] within 7 days.   **If the lab tests need quick action we will call you with the results.  The phone number we will call with results is # [phone] (home) [phone] (work). If this is not the best number please call our clinic and change the number.  Medication Refills:  If you need any refills please call your pharmacy and they will contact us.   If you need to  your refill at a new pharmacy, please contact the new pharmacy directly. The new pharmacy will help you get your medications transferred faster.   Scheduling:  If you have any concerns about today's visit or wish to schedule another appointment please call our office during normal business hours [phone] (8-5:00 M-F)  If a referral was made to a [facility] and you don't get a call from central scheduling please call [phone].  If a Mammogram was ordered for you at [facility] call [phone] to schedule or change your appointment.  If you had an XRay/CT/Ultrasound/MRI ordered the number is [phone] to schedule or change your radiology appointment.   Medical Concerns:  If you have urgent medical concerns please call [phone] at any time of the day.

## 2017-06-22 ASSESSMENT — ASTHMA QUESTIONNAIRES: ACT_TOTALSCORE: 13

## 2017-06-24 ENCOUNTER — HEALTH MAINTENANCE LETTER (OUTPATIENT)
Age: 42
End: 2017-06-24

## 2017-08-07 ENCOUNTER — HOSPITAL ENCOUNTER (OUTPATIENT)
Dept: RADIOLOGY | Facility: CLINIC | Age: 42
Discharge: HOME | End: 2017-08-07
Attending: EMERGENCY MEDICINE

## 2017-08-07 DIAGNOSIS — Z90.3 HISTORY OF SLEEVE GASTRECTOMY: ICD-10-CM

## 2017-08-07 DIAGNOSIS — R63.5 WEIGHT GAIN, ABNORMAL: ICD-10-CM

## 2017-08-07 DIAGNOSIS — K91.2 POSTOPERATIVE MALABSORPTION: ICD-10-CM

## 2017-08-11 ENCOUNTER — TELEPHONE (OUTPATIENT)
Dept: PHARMACY | Facility: CLINIC | Age: 42
End: 2017-08-11

## 2017-08-14 ENCOUNTER — OFFICE VISIT - HEALTHEAST (OUTPATIENT)
Dept: SURGERY | Facility: CLINIC | Age: 42
End: 2017-08-14

## 2017-08-14 DIAGNOSIS — E66.01 OBESITY, CLASS III, BMI 40-49.9 (MORBID OBESITY) (H): ICD-10-CM

## 2017-08-14 DIAGNOSIS — K91.2 POSTOPERATIVE MALABSORPTION: ICD-10-CM

## 2017-08-14 DIAGNOSIS — E55.9 VITAMIN D DEFICIENCY: ICD-10-CM

## 2017-08-14 DIAGNOSIS — R63.5 WEIGHT GAIN: ICD-10-CM

## 2017-08-14 DIAGNOSIS — K90.9 MALABSORPTION: ICD-10-CM

## 2017-08-14 DIAGNOSIS — Z90.3 HISTORY OF SLEEVE GASTRECTOMY: ICD-10-CM

## 2017-08-14 DIAGNOSIS — K44.9 HIATAL HERNIA: ICD-10-CM

## 2017-08-14 DIAGNOSIS — R79.89 LOW VITAMIN B12 LEVEL: ICD-10-CM

## 2017-08-14 DIAGNOSIS — Z87.19 HX OF GASTROESOPHAGEAL REFLUX (GERD): ICD-10-CM

## 2017-08-14 ASSESSMENT — MIFFLIN-ST. JEOR: SCORE: 1899.6

## 2017-08-21 DIAGNOSIS — J45.31 MILD PERSISTENT ALLERGIC ASTHMA WITH ACUTE EXACERBATION: ICD-10-CM

## 2017-08-21 NOTE — TELEPHONE ENCOUNTER
Request for medication refill:    Date of last visit at clinic: 6-16-17    Please complete refill if appropriate and CLOSE ENCOUNTER.    Closing the encounter signifies the refill is complete.    If refill has been denied, please complete the smart phrase .smirefuse and route it to the [facility] RN TRIAGE pool to inform the patient and the pharmacy.    Samra Carcamo MA

## 2017-08-24 ENCOUNTER — DOCUMENTATION ONLY (OUTPATIENT)
Dept: FAMILY MEDICINE | Facility: CLINIC | Age: 42
End: 2017-08-24

## 2017-08-24 NOTE — PROGRESS NOTES
PA started 8/24/17    Cover my meds key: [id number]    Pharmacy [facility], Fax [phone]    Marya Ramirez CMA

## 2017-08-28 ENCOUNTER — ANESTHESIA - HEALTHEAST (OUTPATIENT)
Dept: SURGERY | Facility: CLINIC | Age: 42
End: 2017-08-28

## 2017-08-29 ENCOUNTER — SURGERY - HEALTHEAST (OUTPATIENT)
Dept: SURGERY | Facility: CLINIC | Age: 42
End: 2017-08-29

## 2017-08-29 ASSESSMENT — MIFFLIN-ST. JEOR: SCORE: 1885.54

## 2017-11-06 DIAGNOSIS — K21.9 GASTROESOPHAGEAL REFLUX DISEASE WITHOUT ESOPHAGITIS: ICD-10-CM

## 2017-11-09 RX ORDER — OMEPRAZOLE 40 MG/1
40 CAPSULE, DELAYED RELEASE ORAL DAILY
Qty: 30 CAPSULE | Refills: 1 | Status: SHIPPED | OUTPATIENT
Start: 2017-11-09

## 2017-11-15 DIAGNOSIS — K21.9 GASTROESOPHAGEAL REFLUX DISEASE WITHOUT ESOPHAGITIS: ICD-10-CM

## 2017-11-15 RX ORDER — OMEPRAZOLE 40 MG/1
40 CAPSULE, DELAYED RELEASE ORAL DAILY
Qty: 30 CAPSULE | Refills: 5 | Status: SHIPPED | OUTPATIENT
Start: 2017-11-15

## 2017-11-15 NOTE — TELEPHONE ENCOUNTER
Date of last visit at clinic: 6/16/17    Please complete refill and CLOSE ENCOUNTER.  Closing the encounter signifies the refill is complete.

## 2018-04-23 ENCOUNTER — OFFICE VISIT - HEALTHEAST (OUTPATIENT)
Dept: FAMILY MEDICINE | Facility: CLINIC | Age: 43
End: 2018-04-23

## 2018-04-23 DIAGNOSIS — E11.9 DIABETES (H): ICD-10-CM

## 2018-04-23 DIAGNOSIS — Z30.013 ENCOUNTER FOR INITIAL PRESCRIPTION OF INJECTABLE CONTRACEPTIVE: ICD-10-CM

## 2018-04-23 DIAGNOSIS — R03.0 SINGLE EPISODE OF ELEVATED BLOOD PRESSURE: ICD-10-CM

## 2018-04-23 DIAGNOSIS — Z00.00 ANNUAL PHYSICAL EXAM: ICD-10-CM

## 2018-04-23 DIAGNOSIS — E66.812 CLASS 2 OBESITY DUE TO EXCESS CALORIES WITHOUT SERIOUS COMORBIDITY WITH BODY MASS INDEX (BMI) OF 36.0 TO 36.9 IN ADULT: ICD-10-CM

## 2018-04-23 DIAGNOSIS — Z76.89 ENCOUNTER TO ESTABLISH CARE: ICD-10-CM

## 2018-04-23 DIAGNOSIS — E66.09 CLASS 2 OBESITY DUE TO EXCESS CALORIES WITHOUT SERIOUS COMORBIDITY WITH BODY MASS INDEX (BMI) OF 36.0 TO 36.9 IN ADULT: ICD-10-CM

## 2018-04-23 LAB
HCG UR QL: NEGATIVE
SP GR UR STRIP: 1.02 (ref 1–1.03)

## 2018-04-23 ASSESSMENT — MIFFLIN-ST. JEOR: SCORE: 1768.97

## 2018-04-24 ENCOUNTER — AMBULATORY - HEALTHEAST (OUTPATIENT)
Dept: LAB | Facility: CLINIC | Age: 43
End: 2018-04-24

## 2018-04-24 ENCOUNTER — COMMUNICATION - HEALTHEAST (OUTPATIENT)
Dept: FAMILY MEDICINE | Facility: CLINIC | Age: 43
End: 2018-04-24

## 2018-04-24 DIAGNOSIS — E66.09 CLASS 2 OBESITY DUE TO EXCESS CALORIES WITHOUT SERIOUS COMORBIDITY WITH BODY MASS INDEX (BMI) OF 36.0 TO 36.9 IN ADULT: ICD-10-CM

## 2018-04-24 DIAGNOSIS — Z00.00 ANNUAL PHYSICAL EXAM: ICD-10-CM

## 2018-04-24 DIAGNOSIS — K90.9 MALABSORPTION: ICD-10-CM

## 2018-04-24 DIAGNOSIS — E66.812 CLASS 2 OBESITY DUE TO EXCESS CALORIES WITHOUT SERIOUS COMORBIDITY WITH BODY MASS INDEX (BMI) OF 36.0 TO 36.9 IN ADULT: ICD-10-CM

## 2018-04-24 DIAGNOSIS — E11.9 DIABETES (H): ICD-10-CM

## 2018-04-24 LAB
25(OH)D3 SERPL-MCNC: 24.9 NG/ML (ref 30–80)
ALBUMIN SERPL-MCNC: 3.5 G/DL (ref 3.5–5)
ALP SERPL-CCNC: 56 U/L (ref 45–120)
ALT SERPL W P-5'-P-CCNC: <9 U/L (ref 0–45)
ANION GAP SERPL CALCULATED.3IONS-SCNC: 7 MMOL/L (ref 5–18)
AST SERPL W P-5'-P-CCNC: 11 U/L (ref 0–40)
BILIRUB SERPL-MCNC: 0.5 MG/DL (ref 0–1)
BUN SERPL-MCNC: 8 MG/DL (ref 8–22)
CALCIUM SERPL-MCNC: 8.7 MG/DL (ref 8.5–10.5)
CHLORIDE BLD-SCNC: 106 MMOL/L (ref 98–107)
CHOLEST SERPL-MCNC: 140 MG/DL
CO2 SERPL-SCNC: 27 MMOL/L (ref 22–31)
CREAT SERPL-MCNC: 0.68 MG/DL (ref 0.6–1.1)
ERYTHROCYTE [DISTWIDTH] IN BLOOD BY AUTOMATED COUNT: 14.6 % (ref 11–14.5)
FASTING STATUS PATIENT QL REPORTED: YES
GFR SERPL CREATININE-BSD FRML MDRD: >60 ML/MIN/1.73M2
GLUCOSE BLD-MCNC: 85 MG/DL (ref 70–125)
HBA1C MFR BLD: 4.8 % (ref 4.2–6.1)
HCT VFR BLD AUTO: 40.6 % (ref 35–47)
HDLC SERPL-MCNC: 45 MG/DL
HGB BLD-MCNC: 13.6 G/DL (ref 12–16)
LDLC SERPL CALC-MCNC: 73 MG/DL
MCH RBC QN AUTO: 29.2 PG (ref 27–34)
MCHC RBC AUTO-ENTMCNC: 33.5 G/DL (ref 32–36)
MCV RBC AUTO: 87 FL (ref 80–100)
PLATELET # BLD AUTO: 343 THOU/UL (ref 140–440)
PMV BLD AUTO: 9.7 FL (ref 8.5–12.5)
POTASSIUM BLD-SCNC: 3.5 MMOL/L (ref 3.5–5)
PROT SERPL-MCNC: 6.6 G/DL (ref 6–8)
RBC # BLD AUTO: 4.66 MILL/UL (ref 3.8–5.4)
SODIUM SERPL-SCNC: 140 MMOL/L (ref 136–145)
TRIGL SERPL-MCNC: 111 MG/DL
TSH SERPL DL<=0.005 MIU/L-ACNC: 1.62 UIU/ML (ref 0.3–5)
WBC: 15.7 THOU/UL (ref 4–11)

## 2018-05-14 ENCOUNTER — COMMUNICATION - HEALTHEAST (OUTPATIENT)
Dept: ADMINISTRATIVE | Facility: CLINIC | Age: 43
End: 2018-05-14

## 2018-05-15 ENCOUNTER — OFFICE VISIT - HEALTHEAST (OUTPATIENT)
Dept: FAMILY MEDICINE | Facility: CLINIC | Age: 43
End: 2018-05-15

## 2018-05-15 DIAGNOSIS — G43.011 INTRACTABLE MIGRAINE WITHOUT AURA AND WITH STATUS MIGRAINOSUS: ICD-10-CM

## 2018-07-06 ENCOUNTER — OFFICE VISIT - HEALTHEAST (OUTPATIENT)
Dept: FAMILY MEDICINE | Facility: CLINIC | Age: 43
End: 2018-07-06

## 2018-07-06 DIAGNOSIS — R21 RASH: ICD-10-CM

## 2018-08-14 ENCOUNTER — OFFICE VISIT - HEALTHEAST (OUTPATIENT)
Dept: FAMILY MEDICINE | Facility: CLINIC | Age: 43
End: 2018-08-14

## 2018-08-14 ENCOUNTER — COMMUNICATION - HEALTHEAST (OUTPATIENT)
Dept: FAMILY MEDICINE | Facility: CLINIC | Age: 43
End: 2018-08-14

## 2018-08-14 DIAGNOSIS — K91.2 POSTOPERATIVE MALABSORPTION: ICD-10-CM

## 2018-08-14 DIAGNOSIS — Z90.3 HISTORY OF SLEEVE GASTRECTOMY: ICD-10-CM

## 2018-08-14 DIAGNOSIS — F41.1 GAD (GENERALIZED ANXIETY DISORDER): ICD-10-CM

## 2018-08-14 DIAGNOSIS — Z30.41 ENCOUNTER FOR SURVEILLANCE OF CONTRACEPTIVE PILLS: ICD-10-CM

## 2018-08-14 DIAGNOSIS — R79.89 LOW VITAMIN B12 LEVEL: ICD-10-CM

## 2018-08-14 DIAGNOSIS — E11.9 DIABETES (H): ICD-10-CM

## 2018-08-14 LAB
FASTING STATUS PATIENT QL REPORTED: YES
GLUCOSE BLD-MCNC: 77 MG/DL (ref 70–125)
HBA1C MFR BLD: 5.2 % (ref 3.5–6)

## 2018-08-14 ASSESSMENT — MIFFLIN-ST. JEOR: SCORE: 1775.77

## 2018-08-16 ENCOUNTER — OFFICE VISIT - HEALTHEAST (OUTPATIENT)
Dept: FAMILY MEDICINE | Facility: CLINIC | Age: 43
End: 2018-08-16

## 2018-08-16 DIAGNOSIS — G43.719 INTRACTABLE CHRONIC MIGRAINE WITHOUT AURA: ICD-10-CM

## 2018-08-19 LAB — VIT B1 PYROPHOSHATE BLD-SCNC: 122 NMOL/L (ref 70–180)

## 2018-08-29 ENCOUNTER — COMMUNICATION - HEALTHEAST (OUTPATIENT)
Dept: SURGERY | Facility: CLINIC | Age: 43
End: 2018-08-29

## 2018-08-29 DIAGNOSIS — Z87.19 HX OF GASTROESOPHAGEAL REFLUX (GERD): ICD-10-CM

## 2018-08-29 DIAGNOSIS — K44.9 HIATAL HERNIA: ICD-10-CM

## 2018-08-29 DIAGNOSIS — Z90.3 HISTORY OF SLEEVE GASTRECTOMY: ICD-10-CM

## 2018-12-05 ENCOUNTER — RECORDS - HEALTHEAST (OUTPATIENT)
Dept: ADMINISTRATIVE | Facility: OTHER | Age: 43
End: 2018-12-05

## 2019-01-04 ENCOUNTER — RECORDS - HEALTHEAST (OUTPATIENT)
Dept: ADMINISTRATIVE | Facility: OTHER | Age: 44
End: 2019-01-04

## 2019-05-13 ENCOUNTER — COMMUNICATION - HEALTHEAST (OUTPATIENT)
Dept: FAMILY MEDICINE | Facility: CLINIC | Age: 44
End: 2019-05-13

## 2019-07-09 ENCOUNTER — AMBULATORY - HEALTHEAST (OUTPATIENT)
Dept: MULTI SPECIALTY CLINIC | Facility: CLINIC | Age: 44
End: 2019-07-09

## 2019-07-09 LAB — PAP SMEAR - HIM PATIENT REPORTED: ABNORMAL

## 2019-08-06 ENCOUNTER — COMMUNICATION - HEALTHEAST (OUTPATIENT)
Dept: SURGERY | Facility: CLINIC | Age: 44
End: 2019-08-06

## 2019-08-06 DIAGNOSIS — K44.9 HIATAL HERNIA: ICD-10-CM

## 2019-08-06 DIAGNOSIS — Z87.19 HX OF GASTROESOPHAGEAL REFLUX (GERD): ICD-10-CM

## 2019-08-06 DIAGNOSIS — Z90.3 HISTORY OF SLEEVE GASTRECTOMY: ICD-10-CM

## 2019-08-08 ENCOUNTER — AMBULATORY - HEALTHEAST (OUTPATIENT)
Dept: SURGERY | Facility: CLINIC | Age: 44
End: 2019-08-08

## 2019-08-08 DIAGNOSIS — E11.9 DIABETES (H): ICD-10-CM

## 2019-08-08 DIAGNOSIS — F41.1 GAD (GENERALIZED ANXIETY DISORDER): ICD-10-CM

## 2019-08-08 DIAGNOSIS — Z90.3 HISTORY OF SLEEVE GASTRECTOMY: ICD-10-CM

## 2019-08-08 DIAGNOSIS — E55.9 VITAMIN D DEFICIENCY: ICD-10-CM

## 2019-08-08 DIAGNOSIS — E66.09 CLASS 2 OBESITY DUE TO EXCESS CALORIES WITHOUT SERIOUS COMORBIDITY WITH BODY MASS INDEX (BMI) OF 36.0 TO 36.9 IN ADULT: ICD-10-CM

## 2019-08-08 DIAGNOSIS — K91.2 POSTOPERATIVE MALABSORPTION: ICD-10-CM

## 2019-08-08 DIAGNOSIS — E66.812 CLASS 2 OBESITY DUE TO EXCESS CALORIES WITHOUT SERIOUS COMORBIDITY WITH BODY MASS INDEX (BMI) OF 36.0 TO 36.9 IN ADULT: ICD-10-CM

## 2019-08-08 DIAGNOSIS — Z98.84 S/P LAPAROSCOPIC SLEEVE GASTRECTOMY: ICD-10-CM

## 2019-08-08 DIAGNOSIS — K90.9 MALABSORPTION: ICD-10-CM

## 2019-08-10 ENCOUNTER — AMBULATORY - HEALTHEAST (OUTPATIENT)
Dept: LAB | Facility: CLINIC | Age: 44
End: 2019-08-10

## 2019-08-10 DIAGNOSIS — E55.9 VITAMIN D DEFICIENCY: ICD-10-CM

## 2019-08-10 DIAGNOSIS — E66.09 CLASS 2 OBESITY DUE TO EXCESS CALORIES WITHOUT SERIOUS COMORBIDITY WITH BODY MASS INDEX (BMI) OF 36.0 TO 36.9 IN ADULT: ICD-10-CM

## 2019-08-10 DIAGNOSIS — K90.9 MALABSORPTION: ICD-10-CM

## 2019-08-10 DIAGNOSIS — E66.812 CLASS 2 OBESITY DUE TO EXCESS CALORIES WITHOUT SERIOUS COMORBIDITY WITH BODY MASS INDEX (BMI) OF 36.0 TO 36.9 IN ADULT: ICD-10-CM

## 2019-08-10 DIAGNOSIS — F41.1 GAD (GENERALIZED ANXIETY DISORDER): ICD-10-CM

## 2019-08-10 DIAGNOSIS — E11.9 DIABETES (H): ICD-10-CM

## 2019-08-10 DIAGNOSIS — K91.2 POSTOPERATIVE MALABSORPTION: ICD-10-CM

## 2019-08-10 DIAGNOSIS — Z90.3 HISTORY OF SLEEVE GASTRECTOMY: ICD-10-CM

## 2019-08-10 DIAGNOSIS — Z98.84 S/P LAPAROSCOPIC SLEEVE GASTRECTOMY: ICD-10-CM

## 2019-08-10 LAB
ALBUMIN SERPL-MCNC: 3.8 G/DL (ref 3.5–5)
ALP SERPL-CCNC: 43 U/L (ref 45–120)
ALT SERPL W P-5'-P-CCNC: <9 U/L (ref 0–45)
ANION GAP SERPL CALCULATED.3IONS-SCNC: 9 MMOL/L (ref 5–18)
AST SERPL W P-5'-P-CCNC: 13 U/L (ref 0–40)
BILIRUB SERPL-MCNC: 0.5 MG/DL (ref 0–1)
BUN SERPL-MCNC: 8 MG/DL (ref 8–22)
CALCIUM SERPL-MCNC: 8.9 MG/DL (ref 8.5–10.5)
CHLORIDE BLD-SCNC: 108 MMOL/L (ref 98–107)
CHOLEST SERPL-MCNC: 129 MG/DL
CO2 SERPL-SCNC: 26 MMOL/L (ref 22–31)
CREAT SERPL-MCNC: 0.71 MG/DL (ref 0.6–1.1)
ERYTHROCYTE [DISTWIDTH] IN BLOOD BY AUTOMATED COUNT: 13.5 % (ref 11–14.5)
FASTING STATUS PATIENT QL REPORTED: NO
FERRITIN SERPL-MCNC: 22 NG/ML (ref 10–130)
FOLATE SERPL-MCNC: 6.4 NG/ML
GFR SERPL CREATININE-BSD FRML MDRD: >60 ML/MIN/1.73M2
GLUCOSE BLD-MCNC: 76 MG/DL (ref 70–125)
HCT VFR BLD AUTO: 39.6 % (ref 35–47)
HDLC SERPL-MCNC: 37 MG/DL
HGB BLD-MCNC: 13.1 G/DL (ref 12–16)
LDLC SERPL CALC-MCNC: 76 MG/DL
MCH RBC QN AUTO: 28.2 PG (ref 27–34)
MCHC RBC AUTO-ENTMCNC: 33.1 G/DL (ref 32–36)
MCV RBC AUTO: 85 FL (ref 80–100)
PLATELET # BLD AUTO: 273 THOU/UL (ref 140–440)
PMV BLD AUTO: 10 FL (ref 8.5–12.5)
POTASSIUM BLD-SCNC: 3.7 MMOL/L (ref 3.5–5)
PROT SERPL-MCNC: 6.3 G/DL (ref 6–8)
PTH-INTACT SERPL-MCNC: 110 PG/ML (ref 10–86)
RBC # BLD AUTO: 4.64 MILL/UL (ref 3.8–5.4)
SODIUM SERPL-SCNC: 143 MMOL/L (ref 136–145)
TRIGL SERPL-MCNC: 78 MG/DL
VIT B12 SERPL-MCNC: 169 PG/ML (ref 213–816)
WBC: 6.6 THOU/UL (ref 4–11)

## 2019-08-11 LAB — HBA1C MFR BLD: 5.7 % (ref 4.2–6.1)

## 2019-08-12 ENCOUNTER — OFFICE VISIT - HEALTHEAST (OUTPATIENT)
Dept: SURGERY | Facility: CLINIC | Age: 44
End: 2019-08-12

## 2019-08-12 DIAGNOSIS — Z87.19 HX OF GASTROESOPHAGEAL REFLUX (GERD): ICD-10-CM

## 2019-08-12 DIAGNOSIS — K21.9 GASTROESOPHAGEAL REFLUX DISEASE, ESOPHAGITIS PRESENCE NOT SPECIFIED: ICD-10-CM

## 2019-08-12 DIAGNOSIS — E21.1 HYPERPARATHYROIDISM , SECONDARY, NON-RENAL (H): ICD-10-CM

## 2019-08-12 DIAGNOSIS — K91.2 POSTOPERATIVE MALABSORPTION: ICD-10-CM

## 2019-08-12 DIAGNOSIS — K44.9 HIATAL HERNIA: ICD-10-CM

## 2019-08-12 DIAGNOSIS — R79.89 LOW VITAMIN B12 LEVEL: ICD-10-CM

## 2019-08-12 DIAGNOSIS — Z90.3 HISTORY OF SLEEVE GASTRECTOMY: ICD-10-CM

## 2019-08-12 DIAGNOSIS — E53.8 VITAMIN B12 DEFICIENCY (NON ANEMIC): ICD-10-CM

## 2019-08-12 LAB — 25(OH)D3 SERPL-MCNC: 22.3 NG/ML (ref 30–80)

## 2019-08-12 ASSESSMENT — MIFFLIN-ST. JEOR: SCORE: 1840.64

## 2019-08-14 LAB
ANNOTATION COMMENT IMP: NORMAL
VIT A SERPL-MCNC: 0.42 MG/L (ref 0.3–1.2)
VITAMIN A (RETINYL PALMITATE): <0.02 MG/L (ref 0–0.1)
ZINC SERPL-MCNC: 56.4 UG/DL (ref 60–120)

## 2019-08-15 LAB — VIT B1 PYROPHOSHATE BLD-SCNC: 93 NMOL/L (ref 70–180)

## 2019-08-16 ENCOUNTER — AMBULATORY - HEALTHEAST (OUTPATIENT)
Dept: SURGERY | Facility: CLINIC | Age: 44
End: 2019-08-16

## 2019-08-16 DIAGNOSIS — E60 ZINC DEFICIENCY: ICD-10-CM

## 2019-08-16 DIAGNOSIS — K91.2 POSTOPERATIVE MALABSORPTION: ICD-10-CM

## 2019-08-29 ENCOUNTER — COMMUNICATION - HEALTHEAST (OUTPATIENT)
Dept: FAMILY MEDICINE | Facility: CLINIC | Age: 44
End: 2019-08-29

## 2019-08-29 DIAGNOSIS — F41.1 GAD (GENERALIZED ANXIETY DISORDER): ICD-10-CM

## 2019-09-19 ENCOUNTER — RECORDS - HEALTHEAST (OUTPATIENT)
Dept: ADMINISTRATIVE | Facility: OTHER | Age: 44
End: 2019-09-19

## 2019-09-19 ENCOUNTER — OFFICE VISIT - HEALTHEAST (OUTPATIENT)
Dept: FAMILY MEDICINE | Facility: CLINIC | Age: 44
End: 2019-09-19

## 2019-09-19 DIAGNOSIS — E66.01 MORBID OBESITY (H): ICD-10-CM

## 2019-09-19 DIAGNOSIS — F41.1 GAD (GENERALIZED ANXIETY DISORDER): ICD-10-CM

## 2019-09-19 DIAGNOSIS — Z00.00 ANNUAL PHYSICAL EXAM: ICD-10-CM

## 2019-09-19 DIAGNOSIS — Z12.31 VISIT FOR SCREENING MAMMOGRAM: ICD-10-CM

## 2019-09-19 LAB — TSH SERPL DL<=0.005 MIU/L-ACNC: 0.81 UIU/ML (ref 0.3–5)

## 2019-09-19 ASSESSMENT — MIFFLIN-ST. JEOR: SCORE: 1796.69

## 2019-09-19 ASSESSMENT — PATIENT HEALTH QUESTIONNAIRE - PHQ9: SUM OF ALL RESPONSES TO PHQ QUESTIONS 1-9: 2

## 2019-09-20 ENCOUNTER — COMMUNICATION - HEALTHEAST (OUTPATIENT)
Dept: FAMILY MEDICINE | Facility: CLINIC | Age: 44
End: 2019-09-20

## 2019-09-23 ENCOUNTER — ANESTHESIA - HEALTHEAST (OUTPATIENT)
Dept: SURGERY | Facility: AMBULATORY SURGERY CENTER | Age: 44
End: 2019-09-23

## 2019-09-24 ENCOUNTER — RECORDS - HEALTHEAST (OUTPATIENT)
Dept: ADMINISTRATIVE | Facility: OTHER | Age: 44
End: 2019-09-24

## 2019-11-25 ENCOUNTER — COMMUNICATION - HEALTHEAST (OUTPATIENT)
Dept: FAMILY MEDICINE | Facility: CLINIC | Age: 44
End: 2019-11-25

## 2019-11-25 DIAGNOSIS — Z72.0 TOBACCO ABUSE: ICD-10-CM

## 2019-12-26 ENCOUNTER — COMMUNICATION - HEALTHEAST (OUTPATIENT)
Dept: SCHEDULING | Facility: CLINIC | Age: 44
End: 2019-12-26

## 2020-01-08 ENCOUNTER — OFFICE VISIT - HEALTHEAST (OUTPATIENT)
Dept: FAMILY MEDICINE | Facility: CLINIC | Age: 45
End: 2020-01-08

## 2020-01-08 DIAGNOSIS — F41.1 GAD (GENERALIZED ANXIETY DISORDER): ICD-10-CM

## 2020-01-08 ASSESSMENT — ANXIETY QUESTIONNAIRES
5. BEING SO RESTLESS THAT IT IS HARD TO SIT STILL: NEARLY EVERY DAY
IF YOU CHECKED OFF ANY PROBLEMS ON THIS QUESTIONNAIRE, HOW DIFFICULT HAVE THESE PROBLEMS MADE IT FOR YOU TO DO YOUR WORK, TAKE CARE OF THINGS AT HOME, OR GET ALONG WITH OTHER PEOPLE: EXTREMELY DIFFICULT
7. FEELING AFRAID AS IF SOMETHING AWFUL MIGHT HAPPEN: MORE THAN HALF THE DAYS
3. WORRYING TOO MUCH ABOUT DIFFERENT THINGS: NEARLY EVERY DAY
6. BECOMING EASILY ANNOYED OR IRRITABLE: NEARLY EVERY DAY
1. FEELING NERVOUS, ANXIOUS, OR ON EDGE: NEARLY EVERY DAY
4. TROUBLE RELAXING: NEARLY EVERY DAY
2. NOT BEING ABLE TO STOP OR CONTROL WORRYING: NEARLY EVERY DAY
GAD7 TOTAL SCORE: 20

## 2020-01-08 ASSESSMENT — PATIENT HEALTH QUESTIONNAIRE - PHQ9: SUM OF ALL RESPONSES TO PHQ QUESTIONS 1-9: 19

## 2020-01-19 ENCOUNTER — COMMUNICATION - HEALTHEAST (OUTPATIENT)
Dept: FAMILY MEDICINE | Facility: CLINIC | Age: 45
End: 2020-01-19

## 2020-01-19 DIAGNOSIS — F41.1 GAD (GENERALIZED ANXIETY DISORDER): ICD-10-CM

## 2020-01-23 ENCOUNTER — OFFICE VISIT - HEALTHEAST (OUTPATIENT)
Dept: FAMILY MEDICINE | Facility: CLINIC | Age: 45
End: 2020-01-23

## 2020-01-23 DIAGNOSIS — F41.1 GAD (GENERALIZED ANXIETY DISORDER): ICD-10-CM

## 2020-01-23 DIAGNOSIS — G25.81 RESTLESS LEG: ICD-10-CM

## 2020-01-23 ASSESSMENT — ANXIETY QUESTIONNAIRES
3. WORRYING TOO MUCH ABOUT DIFFERENT THINGS: MORE THAN HALF THE DAYS
5. BEING SO RESTLESS THAT IT IS HARD TO SIT STILL: SEVERAL DAYS
4. TROUBLE RELAXING: MORE THAN HALF THE DAYS
GAD7 TOTAL SCORE: 11
2. NOT BEING ABLE TO STOP OR CONTROL WORRYING: SEVERAL DAYS
1. FEELING NERVOUS, ANXIOUS, OR ON EDGE: MORE THAN HALF THE DAYS
IF YOU CHECKED OFF ANY PROBLEMS ON THIS QUESTIONNAIRE, HOW DIFFICULT HAVE THESE PROBLEMS MADE IT FOR YOU TO DO YOUR WORK, TAKE CARE OF THINGS AT HOME, OR GET ALONG WITH OTHER PEOPLE: SOMEWHAT DIFFICULT
7. FEELING AFRAID AS IF SOMETHING AWFUL MIGHT HAPPEN: SEVERAL DAYS
6. BECOMING EASILY ANNOYED OR IRRITABLE: MORE THAN HALF THE DAYS

## 2020-01-23 ASSESSMENT — PATIENT HEALTH QUESTIONNAIRE - PHQ9: SUM OF ALL RESPONSES TO PHQ QUESTIONS 1-9: 10

## 2020-02-05 ENCOUNTER — COMMUNICATION - HEALTHEAST (OUTPATIENT)
Dept: FAMILY MEDICINE | Facility: CLINIC | Age: 45
End: 2020-02-05

## 2020-02-05 DIAGNOSIS — F41.1 GAD (GENERALIZED ANXIETY DISORDER): ICD-10-CM

## 2020-02-07 ENCOUNTER — COMMUNICATION - HEALTHEAST (OUTPATIENT)
Dept: FAMILY MEDICINE | Facility: CLINIC | Age: 45
End: 2020-02-07

## 2020-02-07 DIAGNOSIS — F41.1 GAD (GENERALIZED ANXIETY DISORDER): ICD-10-CM

## 2020-03-03 ENCOUNTER — COMMUNICATION - HEALTHEAST (OUTPATIENT)
Dept: FAMILY MEDICINE | Facility: CLINIC | Age: 45
End: 2020-03-03

## 2020-03-03 DIAGNOSIS — G25.81 RESTLESS LEG: ICD-10-CM

## 2020-03-03 DIAGNOSIS — F41.1 GAD (GENERALIZED ANXIETY DISORDER): ICD-10-CM

## 2020-05-08 NOTE — TELEPHONE ENCOUNTER
Request for medication refill:    Date of last visit at clinic: 6-16-17    Please complete refill if appropriate and CLOSE ENCOUNTER.    Closing the encounter signifies the refill is complete.    If refill has been denied, please complete the smart phrase .smirefuse and route it to the [facility] RN TRIAGE pool to inform the patient and the pharmacy.    Samra Carcamo MA         Spoke with pt by phone. Is scheduled for later in may with ENT. Still having s/s, on ABX now. Will excuse from work until Thursday. Has ringing and fluid sounds in ear. Patient is still not feeling any better. Wants to know if she should stay out of work?  Please call

## 2020-06-08 ENCOUNTER — COMMUNICATION - HEALTHEAST (OUTPATIENT)
Dept: SURGERY | Facility: CLINIC | Age: 45
End: 2020-06-08

## 2020-06-08 DIAGNOSIS — Z87.19 HX OF GASTROESOPHAGEAL REFLUX (GERD): ICD-10-CM

## 2020-06-08 DIAGNOSIS — K44.9 HIATAL HERNIA: ICD-10-CM

## 2020-06-08 DIAGNOSIS — Z90.3 HISTORY OF SLEEVE GASTRECTOMY: ICD-10-CM

## 2020-06-22 ENCOUNTER — COMMUNICATION - HEALTHEAST (OUTPATIENT)
Dept: FAMILY MEDICINE | Facility: CLINIC | Age: 45
End: 2020-06-22

## 2020-06-22 DIAGNOSIS — F41.1 GAD (GENERALIZED ANXIETY DISORDER): ICD-10-CM

## 2020-07-07 ENCOUNTER — COMMUNICATION - HEALTHEAST (OUTPATIENT)
Dept: FAMILY MEDICINE | Facility: CLINIC | Age: 45
End: 2020-07-07

## 2020-07-07 DIAGNOSIS — F41.1 GAD (GENERALIZED ANXIETY DISORDER): ICD-10-CM

## 2020-07-08 ENCOUNTER — COMMUNICATION - HEALTHEAST (OUTPATIENT)
Dept: SURGERY | Facility: CLINIC | Age: 45
End: 2020-07-08

## 2020-07-19 ENCOUNTER — HOSPITAL ENCOUNTER (EMERGENCY)
Facility: CLINIC | Age: 45
Discharge: HOME | End: 2020-07-19
Attending: EMERGENCY MEDICINE | Admitting: EMERGENCY MEDICINE
Payer: COMMERCIAL

## 2020-07-19 ENCOUNTER — APPOINTMENT (OUTPATIENT)
Dept: MRI IMAGING | Facility: CLINIC | Age: 45
End: 2020-07-19
Attending: EMERGENCY MEDICINE
Payer: COMMERCIAL

## 2020-07-19 ENCOUNTER — APPOINTMENT (OUTPATIENT)
Dept: GENERAL RADIOLOGY | Facility: CLINIC | Age: 45
End: 2020-07-19
Attending: EMERGENCY MEDICINE
Payer: COMMERCIAL

## 2020-07-19 VITALS
RESPIRATION RATE: 16 BRPM | DIASTOLIC BLOOD PRESSURE: 94 MMHG | TEMPERATURE: 97.5 F | SYSTOLIC BLOOD PRESSURE: 134 MMHG | OXYGEN SATURATION: 97 %

## 2020-07-19 DIAGNOSIS — S00.531A CONTUSION OF LIP, INITIAL ENCOUNTER: ICD-10-CM

## 2020-07-19 DIAGNOSIS — M79.641 BILATERAL HAND PAIN: ICD-10-CM

## 2020-07-19 DIAGNOSIS — E87.6 HYPOKALEMIA: ICD-10-CM

## 2020-07-19 DIAGNOSIS — R20.2 PARESTHESIAS: ICD-10-CM
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
## 2020-07-19 DIAGNOSIS — M79.642 BILATERAL HAND PAIN: ICD-10-CM

## 2020-07-19 LAB
ALBUMIN SERPL-MCNC: 3.6 G/DL (ref 3.4–5)
ALP SERPL-CCNC: 52 U/L (ref 40–150)
ALT SERPL W P-5'-P-CCNC: 14 U/L (ref 0–50)
ANION GAP SERPL CALCULATED.3IONS-SCNC: 6 MMOL/L (ref 3–14)
AST SERPL W P-5'-P-CCNC: 22 U/L (ref 0–45)
BASOPHILS # BLD AUTO: 0 10E9/L (ref 0–0.2)
BASOPHILS NFR BLD AUTO: 0.3 %
BILIRUB SERPL-MCNC: 0.6 MG/DL (ref 0.2–1.3)
BUN SERPL-MCNC: 10 MG/DL (ref 7–30)
CALCIUM SERPL-MCNC: 8.6 MG/DL (ref 8.5–10.1)
CHLORIDE SERPL-SCNC: 107 MMOL/L (ref 94–109)
CO2 SERPL-SCNC: 27 MMOL/L (ref 20–32)
CREAT SERPL-MCNC: 0.64 MG/DL (ref 0.52–1.04)
DIFFERENTIAL METHOD BLD: NORMAL
EOSINOPHIL # BLD AUTO: 0.2 10E9/L (ref 0–0.7)
EOSINOPHIL NFR BLD AUTO: 1.7 %
ERYTHROCYTE [DISTWIDTH] IN BLOOD BY AUTOMATED COUNT: 13.9 % (ref 10–15)
ETHANOL SERPL-MCNC: <0.01 G/DL
GFR SERPL CREATININE-BSD FRML MDRD: >90 ML/MIN/{1.73_M2}
GLUCOSE SERPL-MCNC: 80 MG/DL (ref 70–99)
HCT VFR BLD AUTO: 38.8 % (ref 35–47)
HGB BLD-MCNC: 12.8 G/DL (ref 11.7–15.7)
IMM GRANULOCYTES # BLD: 0 10E9/L (ref 0–0.4)
IMM GRANULOCYTES NFR BLD: 0.4 %
LYMPHOCYTES # BLD AUTO: 1.8 10E9/L (ref 0.8–5.3)
LYMPHOCYTES NFR BLD AUTO: 20.2 %
MCH RBC QN AUTO: 29.3 PG (ref 26.5–33)
MCHC RBC AUTO-ENTMCNC: 33 G/DL (ref 31.5–36.5)
MCV RBC AUTO: 89 FL (ref 78–100)
MONOCYTES # BLD AUTO: 0.7 10E9/L (ref 0–1.3)
MONOCYTES NFR BLD AUTO: 8.1 %
NEUTROPHILS # BLD AUTO: 6.2 10E9/L (ref 1.6–8.3)
NEUTROPHILS NFR BLD AUTO: 69.3 %
NRBC # BLD AUTO: 0 10*3/UL
NRBC BLD AUTO-RTO: 0 /100
PLATELET # BLD AUTO: 324 10E9/L (ref 150–450)
POTASSIUM SERPL-SCNC: 2.9 MMOL/L (ref 3.4–5.3)
PROT SERPL-MCNC: 6.7 G/DL (ref 6.8–8.8)
RBC # BLD AUTO: 4.37 10E12/L (ref 3.8–5.2)
SODIUM SERPL-SCNC: 140 MMOL/L (ref 133–144)
WBC # BLD AUTO: 9 10E9/L (ref 4–11)

## 2020-07-19 PROCEDURE — 96374 THER/PROPH/DIAG INJ IV PUSH: CPT

## 2020-07-19 PROCEDURE — 25000128 H RX IP 250 OP 636: Performed by: EMERGENCY MEDICINE

## 2020-07-19 PROCEDURE — 72141 MRI NECK SPINE W/O DYE: CPT

## 2020-07-19 PROCEDURE — 25000132 ZZH RX MED GY IP 250 OP 250 PS 637: Mod: GY | Performed by: EMERGENCY MEDICINE

## 2020-07-19 PROCEDURE — 80320 DRUG SCREEN QUANTALCOHOLS: CPT | Performed by: EMERGENCY MEDICINE

## 2020-07-19 PROCEDURE — 85025 COMPLETE CBC W/AUTO DIFF WBC: CPT | Performed by: EMERGENCY MEDICINE

## 2020-07-19 PROCEDURE — 73140 X-RAY EXAM OF FINGER(S): CPT | Mod: 50

## 2020-07-19 PROCEDURE — 80053 COMPREHEN METABOLIC PANEL: CPT | Performed by: EMERGENCY MEDICINE

## 2020-07-19 PROCEDURE — 99285 EMERGENCY DEPT VISIT HI MDM: CPT | Mod: 25

## 2020-07-19 PROCEDURE — 73140 X-RAY EXAM OF FINGER(S): CPT | Mod: RT

## 2020-07-19 RX ORDER — POTASSIUM CHLORIDE 1500 MG/1
20 TABLET, EXTENDED RELEASE ORAL 2 TIMES DAILY
Qty: 6 TABLET | Refills: 0 | Status: SHIPPED | OUTPATIENT
Start: 2020-07-19 | End: 2020-07-22

## 2020-07-19 RX ORDER — HALOPERIDOL 5 MG/ML
2 INJECTION INTRAMUSCULAR ONCE
Status: COMPLETED | OUTPATIENT
Start: 2020-07-19 | End: 2020-07-19

## 2020-07-19 RX ORDER — LORAZEPAM 2 MG/ML
1 INJECTION INTRAMUSCULAR
Status: DISCONTINUED | OUTPATIENT
Start: 2020-07-19 | End: 2020-07-19 | Stop reason: HOSPADM

## 2020-07-19 RX ORDER — POTASSIUM CHLORIDE 1500 MG/1
40 TABLET, EXTENDED RELEASE ORAL ONCE
Status: COMPLETED | OUTPATIENT
Start: 2020-07-19 | End: 2020-07-19

## 2020-07-19 RX ADMIN — POTASSIUM CHLORIDE 40 MEQ: 1500 TABLET, EXTENDED RELEASE ORAL at 06:52

## 2020-07-19 RX ADMIN — HALOPERIDOL LACTATE 2 MG: 5 INJECTION, SOLUTION INTRAMUSCULAR at 04:17

## 2020-07-19 NOTE — ED PROVIDER NOTES
"  History     Chief Complaint:  Fall      HPI   Mackenzie Felix is a 45 year old female who presents with bilateral hand and thumb pain after falling out of a chair tonight.  Describes having fallen asleep with her hands on her lap.  Fell out of chair hitting her mouth.  Left upper arm pain and thinks she also hit that area.  Electrical shocks in both forearms    Allergies:  Darvocet [Propoxyphene N-Apap]    Medications:    Albuterol  Prilosec  Neurontin    Past Medical History:    Depression  Diabetes mellitus  GERD  Hypoglycemia  Methamphetamine use  Anxiety  ADD    Past Surgical History:    Sleeve gastrectomy    Family History:    Mother: Diabetes  Father: Breast cancer    Social History:  Smoking Status: Former Smoker  Alcohol Use: Negative  Drug Use: Negative  PCP: Rachel Chen    Marital Status:          Review of Systems  Ten system ROS reviewed and is negative except as above     Physical Exam     Patient Vitals for the past 24 hrs:   BP Temp Temp src Heart Rate Resp SpO2   07/19/20 0430 -- -- -- -- -- 97 %   07/19/20 0340 -- -- -- -- -- 98 %   07/19/20 0336 (!) 134/94 97.5  F (36.4  C) Oral 72 16 100 %       Physical Exam  Eyes:  Sclera white; Pupils are equal and round  ENT:    External ears and nares normal; upper lip abrasion, no gingival injury or loose teeth  CV:  Rate as above with regular rhythm   Resp:  Breath sounds clear and equal bilaterally  MS:  Moves all extremities.  Will not let me touch forearms or thumbs.  Barely moving thumbs and slowly moving fingers.  Shoulder and elbow ROM preserved.    No midline neck tenderness    Point to L upper arm as having \"a knot\"  Skin:  Warm and dry  Neuro:  Speech is normal and fluent. No apparent deficit.  Hyperesthesia in bilateral thumbs.        Emergency Department Course   Imaging:  Radiology findings were communicated with the patient who voiced understanding of the findings.  XR Finger Right G/E 2 Views   Final Result   IMPRESSION: No " visualized acute fracture or malalignment of the right or left thumb.          Cervical spine MRI w/o contrast   Final Result   IMPRESSION:   1.  Normal cervical and upper thoracic cord.   2.  No definite fracture or ligamentous injury.   3.  Mild to moderate degenerative changes with moderate canal narrowing at C4-C5, mild to moderate at C5-C6, mild at C2-C3 and C3-C4.   4.  Multilevel neural foraminal narrowing, as described.         XR Finger Left G/E 2 Views    (Results Pending)      MRI Cervical Spine without contrast:   1.  Normal cervical and upper thoracic cord.   2.  No definite fracture or ligamentous injury.   3.  Mild to moderate degenerative changes with moderate canal narrowing at C4-C5, mild to moderate at C5-C6, mild at C2-C3 and C3-C4.   4.  Multilevel neural foraminal narrowing, as described, as per radiology.    XR Finger 2 views, Right:   No visualized acute fracture or malalignment of the right or left thumb, as per radiology.    XR Finger 2 views, Left:   Not specifically reported but is included in read above    Laboratory:  Laboratory findings were communicated with the patient who voiced understanding of the findings.    CBC: WBC: 9.0, HGB: 12.8, PLT: 324   CMP: K 2.9 (L), Protein total 6.7 (L), o/w WNL (Creatinine: 0.64)  Alcohol ethyl: <0.01    Interventions:  0417 Haldol 2 mg IV  0652 Klor-Con 40 mEq PO    Emergency Department Course:  Past medical records, nursing notes, and vitals reviewed.    0345 I performed an exam of the patient as documented above.     IV was inserted and blood was drawn for laboratory testing, results above.    The patient was sent for a cervical spine MRI, and right and left finger x-rays while in the emergency department, results above.     I rechecked the patient and discussed the results of her workup thus far.     Findings and plan explained to the Patient. Patient discharged home with instructions regarding supportive care, medications, and reasons to  return. The importance of close follow-up was reviewed. The patient was prescribed potassium chloride.    I personally reviewed the laboratory and imaging results with the Patient and answered all related questions prior to discharge.     Impression & Plan   Medical Decision Making:  Mechanism is low speed but symptoms concerning for cervical spine injury.  C-collar placed.  Haldol for pain.  X-rays obtained of thumbs with no fracture or dislocation.  Patient refused x-ray of the arm.  MRI w/o cervical spine fracture or spinal cord injury.  No headache, LOC, or blood thinners to increase risk of intracranial bleed.  Hypokalemia can contribute to cramps and spasms.  Oral replacement here + 3 days at home.  F/u clinic for recheck.    Diagnosis:    ICD-10-CM    1. Bilateral hand pain  M79.641     M79.642    2. Paresthesias  R20.2    3. Contusion of lip, initial encounter  S00.531A    4. Fall, initial encounter  W19.XXXA    5. Hypokalemia  E87.6        Disposition:  Discharged to home.    Discharge Medications:  New Prescriptions    POTASSIUM CHLORIDE ER (KLOR-CON M) 20 MEQ CR TABLET    Take 1 tablet (20 mEq) by mouth 2 times daily for 3 days       Scribe Disclosure:  I, Rufus Sauceda, am serving as a scribe at 4:34 AM on 7/19/2020 to document services personally performed by Deb Jung, * based on my observations and the provider's statements to me.     Scribe Disclosure:  I, Ping Roe, am serving as a scribe on 7/19/2020 at 6:18 AM to personally document services performed by Deb Jung, * based on my observations and the provider's statements to me.         Deb Jung MD  07/19/20 0868

## 2020-07-19 NOTE — ED AVS SNAPSHOT
[facility] Emergency Department  [address]  [facility] [address]  Phone:  [phone]  Fax:  [phone]                                    Mackenzie Felix   MRN: [id number]    Department:  [facility] Emergency Department   Date of Visit:  7/19/2020           After Visit Summary Signature Page    I have received my discharge instructions, and my questions have been answered. I have discussed any challenges I see with this plan with the nurse or doctor.    ..........................................................................................................................................  Patient/Patient Representative Signature      ..........................................................................................................................................  Patient Representative Print Name and Relationship to Patient    ..................................................               ................................................  Date                                   Time    ..........................................................................................................................................  Reviewed by Signature/Title    ...................................................              ..............................................  Date                                               Time          22EPIC Rev 08/18

## 2020-07-19 NOTE — ED TRIAGE NOTES
Here for fall off chair. Patient stated that she was leaning forward while sitting on a chair, feel asleep and then fell forward hitting her face on the floor. Denies any other injuries. Denies cervical tenderness. History of chronic nerve pain. C/o bilateral lower forearm and hand pain. Ems gave 100mcg Fentanyl IM.

## 2020-07-19 NOTE — ED NOTES
Bed: ED15  Expected date: 7/19/20  Expected time: 3:28 AM  Means of arrival:   Comments:  HE, 45 F fall, nerve pain

## 2020-08-07 ENCOUNTER — COMMUNICATION - HEALTHEAST (OUTPATIENT)
Dept: FAMILY MEDICINE | Facility: CLINIC | Age: 45
End: 2020-08-07

## 2020-08-10 ENCOUNTER — COMMUNICATION - HEALTHEAST (OUTPATIENT)
Dept: FAMILY MEDICINE | Facility: CLINIC | Age: 45
End: 2020-08-10

## 2020-08-11 ENCOUNTER — COMMUNICATION - HEALTHEAST (OUTPATIENT)
Dept: FAMILY MEDICINE | Facility: CLINIC | Age: 45
End: 2020-08-11

## 2020-08-13 ENCOUNTER — OFFICE VISIT - HEALTHEAST (OUTPATIENT)
Dept: FAMILY MEDICINE | Facility: CLINIC | Age: 45
End: 2020-08-13
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
## 2020-08-13 DIAGNOSIS — R41.3 POOR MEMORY: ICD-10-CM

## 2020-08-13 DIAGNOSIS — R20.2 PARESTHESIA OF BOTH HANDS: ICD-10-CM

## 2020-08-13 ASSESSMENT — PATIENT HEALTH QUESTIONNAIRE - PHQ9: SUM OF ALL RESPONSES TO PHQ QUESTIONS 1-9: 15

## 2021-01-15 ENCOUNTER — HEALTH MAINTENANCE LETTER (OUTPATIENT)
Age: 46
End: 2021-01-15

## 2021-01-24 ENCOUNTER — HEALTH MAINTENANCE LETTER (OUTPATIENT)
Age: 46
End: 2021-01-24

## 2021-03-30 ENCOUNTER — COMMUNICATION - HEALTHEAST (OUTPATIENT)
Dept: SURGERY | Facility: CLINIC | Age: 46
End: 2021-03-30

## 2021-03-30 DIAGNOSIS — K44.9 HIATAL HERNIA: ICD-10-CM

## 2021-03-30 DIAGNOSIS — Z87.19 HX OF GASTROESOPHAGEAL REFLUX (GERD): ICD-10-CM

## 2021-03-30 DIAGNOSIS — Z90.3 HISTORY OF SLEEVE GASTRECTOMY: ICD-10-CM

## 2021-05-16 ENCOUNTER — HEALTH MAINTENANCE LETTER (OUTPATIENT)
Age: 46
End: 2021-05-16

## 2021-05-26 ASSESSMENT — PATIENT HEALTH QUESTIONNAIRE - PHQ9: SUM OF ALL RESPONSES TO PHQ QUESTIONS 1-9: 2

## 2021-05-27 ASSESSMENT — PATIENT HEALTH QUESTIONNAIRE - PHQ9
SUM OF ALL RESPONSES TO PHQ QUESTIONS 1-9: 15
SUM OF ALL RESPONSES TO PHQ QUESTIONS 1-9: 19
SUM OF ALL RESPONSES TO PHQ QUESTIONS 1-9: 10

## 2021-05-28 ENCOUNTER — RECORDS - HEALTHEAST (OUTPATIENT)
Dept: ADMINISTRATIVE | Facility: CLINIC | Age: 46
End: 2021-05-28

## 2021-05-28 ASSESSMENT — ANXIETY QUESTIONNAIRES
GAD7 TOTAL SCORE: 11
GAD7 TOTAL SCORE: 20

## 2021-05-31 ENCOUNTER — RECORDS - HEALTHEAST (OUTPATIENT)
Dept: ADMINISTRATIVE | Facility: CLINIC | Age: 46
End: 2021-05-31

## 2021-05-31 VITALS — WEIGHT: 263.9 LBS | HEIGHT: 68 IN | BODY MASS INDEX: 39.99 KG/M2

## 2021-05-31 VITALS — HEIGHT: 68 IN | BODY MASS INDEX: 40.47 KG/M2 | WEIGHT: 267 LBS

## 2021-05-31 NOTE — PATIENT INSTRUCTIONS - HE
Plan:  1. Welcome back, to get back on track and optimize your own health, remember to use your B12 daily, 1000mcg sublingual for now. D3 5000IUs daily. Multivitamin double dose. Consider Patch MD product called Multi-plus if trouble swallowing pills, they often have sales.    2. Recheck GERD sx w/ EGD given the last one was cancelled due to some illness. Continue omeprazole and try a week of carafate to calm down the irritation. Smoking cessation is vital for preventing severe disease/recurrence and to open the door to some additional surgical options if needed down the road.    3. B12 shot given today due to very low levels.    4. Continue CPAP.     5. PTH levels should improve as your vitamin D level gets normalized and near 40-50.    [facility]  Nutritional Guidelines  Gastric Sleeve 18 Months Post Op and Beyond    General Guidelines and Helpful Hints:    Eat 3 meals per day + protein supplement(s). No snacks between meals.  o Do not skip meals.  This can cause overeating at the next meal and will prevent adequate protein and nutritional intake.    Aim for 60-80 grams of protein per day.  o Always eat your protein first. This assists with optimal nutrition and helps you stay full longer.  o Depending on your portion size, you may need to drink approved protein supplement between meals to achieve protein goals. Follow recommendations of your Dietitian.     Eat your protein first, and then follow with fiber.   o It is not necessary to count your fiber, but 15-20 grams per day is recommended.    o Add fiber by including fruits, vegetables, whole grains, and beans.     Portions should remain about 1 cup per meal. Use measuring cups to be accurate.    Continue to use saucer/salad plates, infant/toddler silverware to keep portion sizes small and take small bites.    Eat S-L-O-W-L-Y to make each meal last 20-30 minutes. Always stop eating when satisfied.    Continue to use caution with foods containing  skins, peels or membranes. Chew well!    Aim for 64 oz. of calorie-free fluids daily.  o Continue to avoid caffeine and carbonation. If you choose to drink alcohol, do so in moderation.   o Remember to avoid drinking during meals, 15-30 minutes before and 30 minutes after.    Exercise is diaz for continued weight loss and weight maintenance. Aim for 30-60 minutes of physical activity most days of the week. Include cardiovascular and strength training.    If having trouble tolerating meat, try using a crock-pot, tinfoil tent, steamer or other moist cooking method to create tender meats. Add broth or low-fat gravy to help meat stay moist.     Avoid high sugar and high fat foods to prevent high calorie intakes and a reduced rate of weight loss, or weight regain.  o Check nutrition labels for less than 10 grams of sugar and less than 10 grams of fat per serving.    Continue Taking Vitamins/Minerals:  o 3726-3962 mcg of Sublingual B-12 daily  o 1 Multivitamin with Iron twice daily (chewable or swallow tabs)  o 500-600 mg Calcium Citrate twice daily (chewable or swallow tabs)  o 5000 IU Vitamin D3 daily    Sample Grocery List    Protein:    Fat free Greek or light yogurt (less than 10 grams sugar)    Fat free or low-fat cottage cheese    String cheese or reduced fat cheese slices    Tuna, salmon, crab, egg, or chicken salad made with light or fat free mayonnaise    Egg or Egg Substitute    Lean/extra lean turkey, beef, bison, venison (ground, sirloin, round, flank)    Pork loin or tenderloin (grilled, baked, broiled)    Fish such as salmon, tuna, trout, tilapia, etc. (grilled, baked, broiled)    Tender cuts of lean (skinless) turkey or chicken    Lean deli meats: turkey, lean ham, chicken, lean roast beef    Beans such as kidney, garbanzo, black, bai, or low-fat/fat free refried beans    Peanut butter (natural preferred). Limit to 1 Tbsp. per day.    Low-fat meatloaf (made with lean ground beef or turkey)    Zina Long  made with low-sugar ketchup and lean ground beef or turkey    Soy or vegetable protein (i.e. vegan crumbles, soy/veggie burger, tofu)    Hummus    Vegetables:    Fresh: cooked or raw (as tolerated)    Frozen vegetables    Canned vegetables (low sodium or no salt added, rinse before cooking/eating)    (Ok to have skins/peels/membranes/seeds - just chew well)    Fruits:    Fresh fruit    Frozen fruit (no sugar added)    Canned fruit (packed in its own juice, NOT syrup)    (Ok to have skins/peels/membranes/seeds - just chew well)    Starch:    Unsweetened whole-grain hot cereal (or high fiber cold cereal, dry)    Toasted whole wheat bread or Brownfield Thins    Whole grain crackers    Baked /boiled/mashed potato/sweet potato    Cooked whole grain pasta, brown rice, or other cooked whole grains    Starchy vegetables: corn, peas, winter squash    Protein Supplement:     Ready to drink protein shake with:  o 15-30 grams protein per serving  o Less than 10 grams total carbohydrate per serving     Protein powder mixed with:  o  Skim or 1% milk  o Low fat or fat free Lactaid milk, plain or no sugar added soymilk  o Water     Fats: (use in moderation)    1 teaspoon of soft tub margarine    1 teaspoon olive oil, canola oil, or peanut oil    1 tablespoon of low-fat antony or salad dressing     Sample Menu for 18+ months after Gastric Sleeve    You do NOT need to eat/drink the full portion sizes listed below  Always stop when you are satisfied    Breakfast   cup 1% cottage cheese     cup mixed berries   Lunch 2 oz lean roast beef on   Brownfield Thin with 1 tsp. light antony    small tomato, chopped, mixed with 1 tsp. light vinaigrette dressing   Supplement Approved protein supplement (if needed between meals)   Dinner 2 oz grilled salmon    cup salad greens with 1 tsp. light salad dressing and 1 tsp. ground flax seed    cup quinoa or brown rice     Breakfast   cup egg substitute with   cup sautéed chopped vegetables  2 light Rye Krisp  crackers   Lunch Tuna Melt:   cup tuna mixed with 1 tsp. light antony over   [place] Thin. Top with 2-3 slices cucumber and 1 oz slice of low fat cheese   Supplement 1 cup skim milk (if needed between meals)   Dinner 3 oz  grilled, broiled, or baked seasoned skinless chicken breast    cup asparagus     Breakfast   cup plain oatmeal made with skim or 1% milk with 1 Tbsp. flavored/unflavored protein powder added  1 mozzarella string cheese   Lunch 2 oz deli turkey breast  1/3 cup salad with 1 tsp. light salad dressing, 1/8 of a whole avocado and 1 Tbsp. sunflower seeds   Dinner 3 oz. pork loin made in a crock pot, seasoned with a spice rub    cup cooked carrots   Supplement Approved protein supplement (if needed between meals)     Breakfast 1 cup breakfast casserole made with egg substitute, turkey sausage,  and steamed, chopped bell peppers   Supplement  1 cup light Greek yogurt (if needed between meals)   Lunch 2 oz. teriyaki turkey    cup mashed sweet potato with 1-2 spritzes of spray butter (like Parkay)    cup fresh pineapple   Dinner 3 oz low fat meatloaf    cup roasted garlic zucchini     Breakfast   cup leftover breakfast casserole    cup no sugar added applesauce with 1 Tbsp. unflavored protein powder and a sprinkle of cinnamon    Lunch 3 oz shrimp with 1-2 Tbsp. low-sugar cocktail sauce for dipping    c. whole wheat pasta drizzled with   tsp. olive oil   Supplement 1 cup skim/1% milk with scoop of protein powder (if needed between meals)   Dinner Grilled, seasoned kebob with 2 oz lean beef and   cup vegetables     Breakfast Breakfast pizza:   [place] Thin spread with 1 Tbsp. low sugar spaghetti sauce,   cup shredded low fat cheese, melted and 1 slice of [place] mackey     cup fresh fruit mixed with chopped almonds   Lunch   cup black bean soup  4-5 whole grain crackers   Dinner 3 oz  tilapia with lemon pepper seasoning    cup stewed tomatoes   Supplement 1 string cheese (if needed between meals)      Breakfast 2 hard boiled eggs (discard 1 egg yolk)    whole wheat English Muffin with 1 tsp. low sugar jelly   Lunch   cup leftover black bean soup topped with 1-2 Tbsp. low fat cheese  2-3 light Rye Krisp crackers   Supplement Approved protein supplement (if needed between meals)   Dinner 3 oz sirloin steak    cup steamed broccoli

## 2021-05-31 NOTE — PROGRESS NOTES
[facility] Follow Up Visit for Previous Bariatric Surgery   Date of visit: 8/12/2019  Physician: Kalpesh Sierra MD  Primary Care is Apolonia Pickett, BRENDAN.  Mackenzie Felix   44 y.o.  female    Date of Surgery: 9/11/13  Initial Weight: 308 lbs  Initial BMI: 46.7  Today's Weight:   Wt Readings from Last 1 Encounters:   08/12/19 (!) 254 lb (115.2 kg)     Body mass index is 38.62 kg/m .  Weight: (!) 254 lb (115.2 kg)       Assessment and Plan   Assessment: Mackenzie is a 44 y.o. year old female who is nearly 6 years s/p  Sleeve Gastrectomy with Dr. Camilo.  She has had a durable weight loss of 54 lbs since surgery.  Overall compliance with the [facility]ric Surgery Program has been poor and she continues to struggle with proper bariatric method and vitamin supplementation as she goes through a difficult time with her husbands throat cancer diagnosis; goals for each discussed today and B12 shot given due to low levels on labs reflective of poor intake. Her low vitamin D is contributing to her elevated PTH levels on 5000IUs daily encouraged of D3. Sleep apnea is controlled w/ CPAP use. She'd had an EGD ordered after our last visit in 2017 but it was cancelled apparently and since her GERD sx have been semi-controlled w/ PPI therapy and EGD was ordered to follow up the severe GERD seen on upper GI in 2017.  Nicotine use has started back up and puts her at risk for esophageal/stomach issues, increased GERD as well, cessation counseling given today.  Given some persistent weight creep, will refer back to dietician for help w/ improving bariatric method diet strategies.  .  Mackenzie Felix feels that she has achieved her   preoperative goals for bariatric surgery.    Plan:  Welcome back, to get back on track and optimize your own health, remember to use your B12 daily, 1000mcg sublingual for now. D3 5000IUs daily. Multivitamin double dose. Consider Patch MD product called Multi-plus if trouble  "swallowing pills, they often have sales.    2. Recheck GERD sx w/ EGD given the last one was cancelled due to some illness. Continue omeprazole and try a week of carafate to calm down the irritation. Smoking cessation is vital for preventing severe disease/recurrence and to open the door to some additional surgical options if needed down the road.    3. B12 shot given today due to very low levels.    4. Continue CPAP.     5. PTH levels should improve as your vitamin D level gets normalized and near 40-50.    No diagnosis found.    No follow-ups on file.     Bariatric Surgery Review   Interim History/LifeChanges:  has throat cancer, Dad  of lung cancer. Quit job to take care of her . Restarted smoking. Not using vitamins regularly.    Patient Concerns: \"doing pretty well\".     Medication changes: no    Appetite (1-10): some increased size and grazing.    GERD sx at all? Yes, epigastric pains, feels worse at night.     Vitamin Intake:   Multivitamin   occ   Vitamin D  no   Calcium  no   Vit. B-12    no     Habits:            Alcohol Intake  none for nearly 4 years after hx of dependency   NSAID Use  no   Caffeine Use  no   Exercise  no   CPAP Use:  yes   Birth Control  no                                            LABS: partially reviewed      LABS:  Lab Results   Component Value Date    WBC 6.6 08/10/2019    HGB 13.1 08/10/2019    HCT 39.6 08/10/2019    MCV 85 08/10/2019     08/10/2019      Lab Results   Component Value Date    GPQLZURK68VO 22.3 (L) 08/10/2019    Lab Results   Component Value Date    HGBA1C 5.7 08/10/2019      Lab Results   Component Value Date    CHOL 129 08/10/2019    Lab Results   Component Value Date     (H) 08/10/2019         Lab Results   Component Value Date    FERRITIN 22 08/10/2019      Lab Results   Component Value Date    HDL 37 (L) 08/10/2019      Lab Results   Component Value Date    GRQGBMAF72 169 (L) 08/10/2019    No results found for: 62716   Lab " Results   Component Value Date    LDLCALC 76 08/10/2019    Lab Results   Component Value Date    TSH 1.62 04/24/2018    Lab Results   Component Value Date    FOLATE 6.4 08/10/2019      Lab Results   Component Value Date    TRIG 78 08/10/2019    Lab Results   Component Value Date    ALT <9 08/10/2019    AST 13 08/10/2019    ALKPHOS 43 (L) 08/10/2019    BILITOT 0.5 08/10/2019    No results found for: TESTOSTERONE     No components found for: CHOLHDL No results found for: 7597   @[facility](vitamin a: 1)@          Patient Profile   Social History     Social History Narrative     Not on file        Past Medical History   Past Medical History:   Diagnosis Date     Active smoker      Anxiety      Anxiety disorder      Asthma      Cervical disc disease      Diabetes mellitus (H)      GERD (gastroesophageal reflux disease)      Hiatal hernia      Low back pain      Obesity, morbid, BMI 40.0-49.9 (H)      Sleep apnea     CPAP     Substance abuse     methamphetamine, sober since 11/28/15 teen [facility] resident currently.     Patient Active Problem List   Diagnosis     Back Pain     S/P laparoscopic sleeve gastrectomy     Diabetes (H)     BENTON (obstructive sleep apnea)     Class 2 obesity in adult     MICHAEL (generalized anxiety disorder)     Encounter for surveillance of contraceptive pills     Current Outpatient Medications   Medication Sig     acetaminophen (TYLENOL) 325 MG tablet Take 650 mg by mouth every 6 (six) hours as needed for pain.     ADVAIR DISKUS 250-50 mcg/dose DISKUS Inhale 1 puff 2 (two) times a day.     albuterol (PROAIR HFA;PROVENTIL HFA;VENTOLIN HFA) 90 mcg/actuation inhaler Inhale 2 puffs every 4 (four) hours as needed for shortness of breath.     cholecalciferol, vitamin D3, 5,000 unit Tab Take 1 tablet (5,000 Units total) by mouth daily.     cyanocobalamin, vitamin B-12, 1,000 mcg Subl Place 1 tablet (1,000 mcg total) under the tongue daily. Post Bariatric surgery, malabsorption.     escitalopram oxalate  "(LEXAPRO) 20 MG tablet Take 1 tablet (20 mg total) by mouth daily.     hydrocortisone 1 % cream Apply 1 drop topically as needed.     ibuprofen (ADVIL,MOTRIN) 200 MG tablet Take 600 mg by mouth every 6 (six) hours as needed for pain.     omeprazole (PRILOSEC) 40 MG capsule Take 1 capsule (40 mg total) by mouth 2 (two) times a day.     SUMAtriptan (IMITREX) 25 MG tablet Take 1 tablet (25 mg total) by mouth once as needed for migraine.     traZODone (DESYREL) 50 MG tablet TAKE 1 TABLET BY MOUTH AT BEDTIME IN ADDITION TO SCHEDULED 50MG TABLET     triamcinolone (KENALOG) 0.1 % ointment Apply 1 application topically 2 (two) times a day.       Past Surgical History  She has a past surgical history that includes Tonsillectomy; Knee arthroscopy (Right, 1995); and Small intestine surgery (09/11/2013).     Examination   Resp 16   Ht 5' 8\" (1.727 m)   Wt (!) 254 lb (115.2 kg)   BMI 38.62 kg/m    Height: 5' 8\" (1.727 m) (8/12/2019 12:28 PM)  Weight: (!) 254 lb (115.2 kg) (8/12/2019 12:28 PM)  BMI (Calculated): 38.6 (8/12/2019 12:28 PM)  SpO2: 98 % (11/19/2018 12:58 PM)    General:  Alert and ambulatory,   HEENT:  No conjunctival pallor, moist mucous Membranes, neck is thick. Appears tired.   Pulmonary:  Normal respiratory effort, no cough, no audible wheezes/crackles.  CV:  Regular rate and Rhythm, no murmurs, pulses 2 plus  Abdominal: obese, non tender  Extremities: no tremor or edema  Skin:  pale  Pscyh/Mood: a little on the self deceptive side but holding her sobriety and sachin as cornderstones for doing well. High stress w/ care giving towards cancer stricken .          Counseling:   We reviewed the important post op bariatric recommendations:  -eating 3 meals daily  -eating protein first, getting >60gm protein daily  -eating slowly, chewing food well  -avoiding/limiting calorie containing beverages  -drinking water 15-30 minutes before or after meals  -limiting restaurant or cafeteria eating to twice a week or " less    We discussed the importance of restorative sleep and stress management in maintaining a healthy weight.  We discussed the National Weight Control Registry healthy weight maintenance strategies and ways to optimize metabolism.  We discussed the importance of physical activity including cardiovascular and strength training in maintaining a healthier weight.  We discussed the importance of life-long vitamin supplementation and life-long  follow-up.    Mackenzie was reminded that, to avoid marginal ulcers she should avoid tobacco at all, alcohol in excess, caffeine in excess, and NSAIDS (unless indicated for cardioprotection or othewise and opposed by a PPI).    At least 25 minutes was spent in direct consultation and over 50% of the time devoted to counseling regarding maximizing the benefits of her previous bariatric surgery while minimizing risks of nutritional or structural complications.    Kalpesh Sierra MD  [facility].  8/12/2019  12:29 PM

## 2021-05-31 NOTE — PROGRESS NOTES
Here for 6 yrs s/p LSG f/u visit.  See flowsheet.  Pt did her labs over the weekend at [facility].  Pt's  was recently diagnosed with throat cancer in March.  Pt has not taken vitamins for a long time and started smoking again once in awhile after quitting again in April of this year. Pt has a lot of stress in her life.   She recently had to quit her job d/t missing so much work to care for her .      India Romero RN, CBN  [facility]  P [phone]  F [phone]

## 2021-05-31 NOTE — TELEPHONE ENCOUNTER
RN cannot approve Refill Request    RN can NOT refill this medication Protocol failed and NO refill given.       Veronica Harrison, [facility] Triage/Med Refill 8/29/2019    Requested Prescriptions   Pending Prescriptions Disp Refills     escitalopram oxalate (LEXAPRO) 20 MG tablet 90 tablet 3     Sig: Take 1 tablet (20 mg total) by mouth daily.       SSRI Refill Protocol  Failed - 8/29/2019  9:50 AM        Failed - PCP or prescribing provider visit in last year     Last office visit with prescriber/PCP: 8/14/2018 Apolonia Pickett FNP OR same dept: Visit date not found OR same specialty: 8/14/2018 Apolonia Pickett FNP  Last physical: 4/23/2018 Last MTM visit: Visit date not found   Next visit within 3 mo: Visit date not found  Next physical within 3 mo: Visit date not found  Prescriber OR PCP: BRENDAN Castellanos  Last diagnosis associated with med order: 1. MICHAEL (generalized anxiety disorder)  - escitalopram oxalate (LEXAPRO) 20 MG tablet; Take 1 tablet (20 mg total) by mouth daily.  Dispense: 90 tablet; Refill: 3    If protocol passes may refill for 12 months if within 3 months of last provider visit (or a total of 15 months).

## 2021-06-01 ENCOUNTER — RECORDS - HEALTHEAST (OUTPATIENT)
Dept: ADMINISTRATIVE | Facility: CLINIC | Age: 46
End: 2021-06-01

## 2021-06-01 VITALS — WEIGHT: 238.2 LBS | BODY MASS INDEX: 36.1 KG/M2 | HEIGHT: 68 IN

## 2021-06-01 VITALS — BODY MASS INDEX: 36.33 KG/M2 | HEIGHT: 68 IN | WEIGHT: 239.7 LBS

## 2021-06-01 VITALS — WEIGHT: 239.2 LBS | BODY MASS INDEX: 36.37 KG/M2

## 2021-06-01 VITALS — BODY MASS INDEX: 35.4 KG/M2 | WEIGHT: 232.8 LBS

## 2021-06-01 VITALS — WEIGHT: 235 LBS | BODY MASS INDEX: 35.73 KG/M2

## 2021-06-01 NOTE — ANESTHESIA PREPROCEDURE EVALUATION
Anesthesia Evaluation        Airway    Pulmonary    (+) asthma  moderate, sleep apnea on CPAP, , a smoker                         Cardiovascular   Exercise tolerance: > or = 4 METS   Neuro/Psych    (+) depression, anxiety/panic attacks,     Comments: Cervical disc disease  Methamphetamine abuse    Endo/Other    (+) obesity,      GI/Hepatic/Renal    (+) GERD,             Dental                         Anesthesia Plan

## 2021-06-02 ENCOUNTER — RECORDS - HEALTHEAST (OUTPATIENT)
Dept: ADMINISTRATIVE | Facility: CLINIC | Age: 46
End: 2021-06-02

## 2021-06-03 VITALS
OXYGEN SATURATION: 96 % | HEIGHT: 68 IN | SYSTOLIC BLOOD PRESSURE: 114 MMHG | WEIGHT: 244.31 LBS | HEART RATE: 66 BPM | DIASTOLIC BLOOD PRESSURE: 76 MMHG | BODY MASS INDEX: 37.03 KG/M2

## 2021-06-03 VITALS — HEIGHT: 68 IN | BODY MASS INDEX: 38.62 KG/M2 | HEIGHT: 68 IN | BODY MASS INDEX: 38.62 KG/M2

## 2021-06-03 VITALS — BODY MASS INDEX: 38.49 KG/M2 | WEIGHT: 254 LBS | HEIGHT: 68 IN

## 2021-06-03 VITALS — WEIGHT: 244 LBS | BODY MASS INDEX: 37.1 KG/M2 | BODY MASS INDEX: 37.1 KG/M2 | WEIGHT: 244 LBS

## 2021-06-03 NOTE — TELEPHONE ENCOUNTER
Medication Request  Medication name: Chantix  Pharm[facility] Name and Location[facility]  Reason for request: would like to stop smoking, she has increased over the last three months  When did you use medication last?:  1.5-2 years ago, use of this was successful  Patient offered appointment:  patient was seen in September for physical  Okay to leave a detailed message: yes

## 2021-06-04 VITALS
DIASTOLIC BLOOD PRESSURE: 70 MMHG | BODY MASS INDEX: 31.79 KG/M2 | HEART RATE: 104 BPM | WEIGHT: 209.06 LBS | OXYGEN SATURATION: 98 % | SYSTOLIC BLOOD PRESSURE: 132 MMHG

## 2021-06-04 VITALS
WEIGHT: 208.19 LBS | SYSTOLIC BLOOD PRESSURE: 100 MMHG | OXYGEN SATURATION: 98 % | BODY MASS INDEX: 31.65 KG/M2 | HEART RATE: 90 BPM | DIASTOLIC BLOOD PRESSURE: 68 MMHG

## 2021-06-04 NOTE — TELEPHONE ENCOUNTER
Yesterday was not a good day.    Pt is somewhat teary on the phone. Patient feels that she needs help.    This call is her starting point.    Work is very stressful    Has seen a therapist in the past.    No thoughts of hurting self or others    Has support    Would like an appointment to discuss next step    Appointment scheduled.    Racheal Mccauley, RN   [facility] Medication Refill and Triage Nurse  12:15 PM  12/26/2019      Reason for Disposition    Symptoms interfere with work or school    Protocols used: ANXIETY AND PANIC ATTACK-A-[place]

## 2021-06-05 NOTE — PROGRESS NOTES
Assessment:   1. MICHAEL (generalized anxiety disorder)  Discussed diagnosis and treatment options with patient. Recommend adding an SSRN to her current medication. Also discussed need to manage anxiety with agents such hydroxyzine but patient declined.   - DULoxetine (CYMBALTA) 30 MG capsule; Take 1 capsule (30 mg total) by mouth daily.  Dispense: 7 capsule; Refill: 0  - DULoxetine (CYMBALTA) 60 MG capsule; Take 1 capsule (60 mg total) by mouth daily.  Dispense: 30 capsule; Refill: 0  - Ambulatory referral to Psychology      Plan:   Medications: Lexapro and cymbalta.  Labs: no labs indicated at this time.  Recommended counseling.  Reviewed concept of anxiety as biochemical imbalance of neurotransmitters and rationale for treatment.  Instructed patient to contact office or on-call physician promptly should condition worsen or any new symptoms appear and provided on-call telephone numbers. IF THE PATIENT HAS ANY SUICIDAL OR HOMICIDAL IDEATIONS, CALL THE OFFICE, DISCUSS WITH A SUPPORT MEMBER, OR GO TO THE ER IMMEDIATELY. Patient was agreeable with this plan.  Follow up: 2 weeks.  Spent 25 minutes (>50% of visit) discussing the risks of anxiety disorder, the  pathophysiology, etiology, risks, and principles of treatment.     Subjective:   Mackenzie Felix is a 44 y.o. female who presents for follow up of depression. She reports that the holidays was rough as she was asked to leave the house by her x- which was against their plan. She had left her place to help him while he was battling cancer and after he got better,  He asked her to leave and not waiting to the end of winter as they had agreed. She said she is very sad that her family keeps judging her. She is not in talking terms with one of her daughters. She reports that she has been very anxious lately and its affecting her job and sleep. Current symptoms include depressed mood, difficulty concentrating, hopelessness and insomnia. Symptoms have been  gradually worsening since that time. Patient denies psychomotor agitation, psychomotor retardation, recurrent thoughts of death, suicidal attempt, suicidal thoughts with specific plan and suicidal thoughts without plan. Previous treatment includes: group therapy, individual therapy and medication. She complains of the following side effects from the treatment: none.    The following portions of the patient's history were reviewed and updated as appropriate: allergies, current medications, past family history, past medical history, past social history, past surgical history and problem list.    Review of Systems  A 12 point comprehensive review of systems was negative except as noted.      Objective:      /70   Pulse (!) 104   Wt 209 lb 1 oz (94.8 kg)   SpO2 98%   BMI 31.79 kg/m     General:  alert, appears stated age and cooperative   Affect & Behavior:  full facial expressions, good grooming, good insight, normal perception, normal reasoning, normal speech pattern and content and normal thought patterns

## 2021-06-05 NOTE — TELEPHONE ENCOUNTER
Refill Approved    Rx renewed per Medication Renewal Policy. Medication was last renewed on 1/8/20.    Felicia Bowman, [facility] Connection Triage/Med Refill 2/7/2020     Requested Prescriptions   Pending Prescriptions Disp Refills     DULoxetine (CYMBALTA) 60 MG capsule 30 capsule 0     Sig: Take 1 capsule (60 mg total) by mouth daily.       Tricyclics/Misc Antidepressant/Antianxiety Meds Refill Protocol Passed - 2/7/2020  9:57 AM        Passed - PCP or prescribing provider visit in last year     Last office visit with prescriber/PCP: 1/23/2020 Apolonia Pickett FNP OR same dept: 1/23/2020 Apolonia Pickett FNP OR same specialty: 1/23/2020 Apolonia Pickett FNP  Last physical: 9/19/2019 Last MTM visit: Visit date not found   Next visit within 3 mo: Visit date not found  Next physical within 3 mo: Visit date not found  Prescriber OR PCP: BRENDAN Castellanos  Last diagnosis associated with med order: 1. MICHAEL (generalized anxiety disorder)  - DULoxetine (CYMBALTA) 60 MG capsule; Take 1 capsule (60 mg total) by mouth daily.  Dispense: 30 capsule; Refill: 0    If protocol passes may refill for 12 months if within 3 months of last provider visit (or a total of 15 months).

## 2021-06-05 NOTE — TELEPHONE ENCOUNTER
Refill Approved    Rx renewed per Medication Renewal Policy. Medication was last renewed on 9/19/19.    Veronica Harrison, [facility] Triage/Med Refill 2/5/2020     Requested Prescriptions   Pending Prescriptions Disp Refills     albuterol (PROAIR HFA;PROVENTIL HFA;VENTOLIN HFA) 90 mcg/actuation inhaler [Pharmacy Med Name: ALBUTEROL HFA INH (200 PUFFS) 18GM] 18 g 3     Sig: INHALE 2 PUFFS EVERY 4 HOURS AS NEEDED FOR SHORTNESS OF BREATH.       Albuterol/Levalbuterol Refill Protocol Passed - 2/5/2020  9:44 AM        Passed - PCP or prescribing provider visit in last year     Last office visit with prescriber/PCP: 1/23/2020 Apolonia Pickett FNP OR same dept: 1/23/2020 Apolonia Pickett FNP OR same specialty: 1/23/2020 Apolonia Pickett FNP Last physical: 9/19/2019       Next appt within 3 mo: Visit date not found  Next physical within 3 mo: Visit date not found  Prescriber OR PCP: BRENDAN Castellanos  Last diagnosis associated with med order: 1. MICHAEL (generalized anxiety disorder)  - albuterol (PROAIR HFA;PROVENTIL HFA;VENTOLIN HFA) 90 mcg/actuation inhaler [Pharmacy Med Name: ALBUTEROL HFA INH (200 PUFFS) 18GM]; INHALE 2 PUFFS EVERY 4 HOURS AS NEEDED FOR SHORTNESS OF BREATH.  Dispense: 18 g; Refill: 3    If protocol passes may refill for 6 months if within 3 months of last provider visit (or a total of 9 months). If patient requesting >1 inhaler per month refill x 6 months and have patient make appointment with provider.

## 2021-06-05 NOTE — TELEPHONE ENCOUNTER
Refill Approved    Rx renewed per Medication Renewal Policy. Medication was last renewed on 01/08/2020.    Lacey Hoffmann, [facility] Connection Triage/Med Refill 1/20/2020     Requested Prescriptions   Pending Prescriptions Disp Refills     DULoxetine (CYMBALTA) 30 MG capsule 7 capsule 0     Sig: Take 1 capsule (30 mg total) by mouth daily.       Tricyclics/Misc Antidepressant/Antianxiety Meds Refill Protocol Passed - 1/19/2020  5:16 PM        Passed - PCP or prescribing provider visit in last year     Last office visit with prescriber/PCP: 1/8/2020 Apolonia Pickett FNP OR same dept: 1/8/2020 Apolonia Pickett FNP OR same specialty: 1/8/2020 Apolonia Pickett FNP  Last physical: 9/19/2019 Last MTM visit: Visit date not found   Next visit within 3 mo: Visit date not found  Next physical within 3 mo: Visit date not found  Prescriber OR PCP: BRENDAN Castellanos  Last diagnosis associated with med order: 1. MICHAEL (generalized anxiety disorder)  - DULoxetine (CYMBALTA) 30 MG capsule; Take 1 capsule (30 mg total) by mouth daily.  Dispense: 7 capsule; Refill: 0    If protocol passes may refill for 12 months if within 3 months of last provider visit (or a total of 15 months).

## 2021-06-05 NOTE — PROGRESS NOTES
Assessment:   1. MICHAEL (generalized anxiety disorder)  Recommend stopping lexapro and optimizing Cymbalta.   - traZODone (DESYREL) 150 MG tablet; Take 1 tablet (150 mg total) by mouth at bedtime.  Dispense: 90 tablet; Refill: 3  - DULoxetine (CYMBALTA) 30 MG capsule; Take 1 capsule (30 mg total) by mouth daily.  Dispense: 90 capsule; Refill: 3  - gabapentin (NEURONTIN) 100 MG capsule; Take 200 mg by mouth at bedtime.  Dispense: 60 capsule; Refill: 0    2. Restless leg  Discussed diagnosis and possible treatment. Recommend trial of gabapentin and to follow up in 2 to 4 weeks.   - gabapentin (NEURONTIN) 100 MG capsule; Take 200 mg by mouth at bedtime.  Dispense: 60 capsule; Refill: 0      Plan:   Medications: Cymbalta and trazodone. .  Reviewed concept of anxiety as biochemical imbalance of neurotransmitters and rationale for treatment.  Instructed patient to contact office or on-call physician promptly should condition worsen or any new symptoms appear and provided on-call telephone numbers. IF THE PATIENT HAS ANY SUICIDAL OR HOMICIDAL IDEATIONS, CALL THE OFFICE, DISCUSS WITH A SUPPORT MEMBER, OR GO TO THE ER IMMEDIATELY. Patient was agreeable with this plan.  Follow up: 2 weeks.  Spent 25 minutes (>50% of visit) discussing the risks of anxiety disorder and depression, the  pathophysiology, etiology, risks, and principles of treatment.     Subjective:   Mackenzie Felix is a 44 y.o. female who presents for follow up of MICHAEL. She reports that the new added medication was working well and she feels like she has more motivation. She did complain of not able to sleep at night due to lower leg pain and discomfort. She reports that she wakes up several time at night because of discomfort on her lower legs. She reports that she has tried using pillows but pain has not improved. She denied pain during the day. Symptoms have been gradually improving since that time. Patient denies psychomotor agitation, psychomotor  retardation, recurrent thoughts of death, suicidal attempt, suicidal thoughts with specific plan, suicidal thoughts without plan, weight gain and weight loss. Previous treatment includes: Lexapro. She complains of the following side effects from the treatment: none.    The following portions of the patient's history were reviewed and updated as appropriate: allergies, current medications, past family history, past medical history, past social history, past surgical history and problem list.    Review of Systems  A 12 point comprehensive review of systems was negative except as noted.      Objective:      /68   Pulse 90   Wt 208 lb 3 oz (94.4 kg)   SpO2 98%   BMI 31.65 kg/m     General:  alert, appears stated age and cooperative   Affect & Behavior:  full facial expressions, good grooming, good insight, normal perception, normal reasoning, normal speech pattern and content and normal thought patterns

## 2021-06-06 NOTE — TELEPHONE ENCOUNTER
Refill Request  Did you contact pharmacy: No  Medication name:   Requested Prescriptions     Pending Prescriptions Disp Refills     gabapentin (NEURONTIN) 100 MG capsule 60 capsule 0     Sig: Take 200 mg by mouth at bedtime.     Who prescribed the medication: Apolonia Pickett FNP    [facility]  Is patient out of medication: n/a  Patient notified refills processed in 3 business days:  n/a  Okay to leave a detailed message: yes

## 2021-06-06 NOTE — TELEPHONE ENCOUNTER
Refill Approved    Rx renewed per Medication Renewal Policy. Medication was last renewed on 1/23/20.    Veronica Harrison, [facility] Triage/Med Refill 3/4/2020     Requested Prescriptions   Pending Prescriptions Disp Refills     gabapentin (NEURONTIN) 100 MG capsule 60 capsule 0     Sig: Take 200 mg by mouth at bedtime.       Gabapentin/Levetiracetam/Tiagabine Refill Protocol  Passed - 3/3/2020  2:05 PM        Passed - PCP or prescribing provider visit in past 12 months or next 3 months     Last office visit with prescriber/PCP: 1/23/2020 Apolonia Pickett FNP OR same dept: 1/23/2020 Apolonia Pickett FNP OR same specialty: 1/23/2020 Apolonia Pickett FNP  Last physical: 9/19/2019 Last MTM visit: Visit date not found   Next visit within 3 mo: Visit date not found  Next physical within 3 mo: Visit date not found  Prescriber OR PCP: BRENDAN Castellanos  Last diagnosis associated with med order: 1. MIHCAEL (generalized anxiety disorder)  - gabapentin (NEURONTIN) 100 MG capsule; Take 200 mg by mouth at bedtime.  Dispense: 60 capsule; Refill: 0    2. Restless leg  - gabapentin (NEURONTIN) 100 MG capsule; Take 200 mg by mouth at bedtime.  Dispense: 60 capsule; Refill: 0    If protocol passes may refill for 12 months if within 3 months of last provider visit (or a total of 15 months).

## 2021-06-09 NOTE — TELEPHONE ENCOUNTER
Refill Request  Medication name:   Requested Prescriptions     Pending Prescriptions Disp Refills     albuterol (PROAIR HFA;PROVENTIL HFA;VENTOLIN HFA) 90 mcg/actuation inhaler 18 g 3     Sig: Inhale 2 puffs every 4 (four) hours as needed for wheezing.     Who prescribed the medication: Promise  Last refill on medication: 6/22/2020, Dispense of 18g, Refill of 3  [facility]  Last appointment with PCP: 1/23/2020  Next appointment: none

## 2021-06-09 NOTE — TELEPHONE ENCOUNTER
Refill Request  Medication name:   Requested Prescriptions     Pending Prescriptions Disp Refills     albuterol (PROAIR HFA;PROVENTIL HFA;VENTOLIN HFA) 90 mcg/actuation inhaler 18 g 3     Sig: Inhale 2 puffs every 4 (four) hours as needed for wheezing.     Who prescribed the medication: Promise  Last refill on medication: 2/5/2020, Dispense of 18g, Refill of 3  [facility]ed [facility]  Last appointment with PCP: 1/23/2020  Next appointment: None

## 2021-06-09 NOTE — TELEPHONE ENCOUNTER
List sent to pt via email.    India Romero RN, CBN  [facility]  P [phone]  F [phone]

## 2021-06-09 NOTE — TELEPHONE ENCOUNTER
Pt has been very successful post op, and would like to be emailed the list of recommended plastic surgeons for excess skin removal. Thanks-[email or web address] [email or web address]

## 2021-06-10 NOTE — TELEPHONE ENCOUNTER
Medication Question or Clarification  Who is calling: BRIDGETTE Casillas   What medication are you calling about (include dose and sig)?:    Disp  Refills  Start  End     DULoxetine (CYMBALTA) 60 MG capsule  90 capsule  3  2/7/2020      Sig - Route: Take 1 capsule (60 mg total) by mouth daily. - Oral     Sent to pharmacy as: DULoxetine 60 mg capsule,delayed release (Cymbalta)     Notes to Pharmacy: Start after 7 days of 30 mg daily.     E-Prescribing Status: Receipt confirmed by pharmacy (2/7/2020  1:11 PM CST)        Disp  Refills  Start  End     DULoxetine (CYMBALTA) 30 MG capsule  90 capsule  3  1/23/2020      Sig - Route: Take 1 capsule (30 mg total) by mouth daily. - Oral     Sent to pharmacy as: DULoxetine 30 mg capsule,delayed release (Cymbalta)     Notes to Pharmacy: Take with 60 mg to make 90 mg daily.     E-Prescribing Status: Receipt confirmed by pharmacy (1/23/2020 11:32 AM CST)         Who prescribed the medication?: Apolonia Pickett, FNP   What is your question/concern?: Caller is questioning if the patient is supposed to be taking both medications above together. Caller would like a call back.  Requested Pharmacy: [facility] #[id number]  Okay to leave a detailed message?: No  [phone]

## 2021-06-10 NOTE — PROGRESS NOTES
"Mackenzie Felix is a 45 y.o. female who is being evaluated via a billable telephone visit.      The patient has been notified of following:     \"This telephone visit will be conducted via a call between you and your physician/provider. We have found that certain health care needs can be provided without the need for a physical exam.  This service lets us provide the care you need with a short phone conversation.  If a prescription is necessary we can send it directly to your pharmacy.  If lab work is needed we can place an order for that and you can then stop by our lab to have the test done at a later time.    Telephone visits are billed at different rates depending on your insurance coverage. During this emergency period, for some insurers they may be billed the same as an in-person visit.  Please reach out to your insurance provider with any questions.    If during the course of the call the physician/provider feels a telephone visit is not appropriate, you will not be charged for this service.\"    Patient has given verbal consent to a Telephone visit? Yes    What phone number would you like to be contacted at? [phone]    Patient would like to receive their AVS by AVS Preference: Vivian.    Additional provider notes:     Assessment/Plan:  1. Fall, initial encounter  2. Poor memory  Reviewed ED visit including x-rays of her and MRI of her spine.  Discussed diagnosis with patient and need for further evaluation as her memory has not fully returned.   - Ambulatory referral to Neurology    3. Paresthesia of both hands  Reviewed ED visit including x-rays of her and MRI of her spine. Discussed diagnosis and need for further evaluation. Recommend spine care. Patient verbalized understanding.   - Ambulatory referral to Spine Care    Subjective:  Mackenzie Felix, 45-year-old female who was spoken to via telephone visit for follow-up on questions about recent visit to the ED.  Patient reported that she is " unable to remember what happened to her before going to the ER.  She reported that she originally thought that she fell but she does not know what actually happened.  She also reported that she is still having symptoms on her arms.  She stated it feels like electric shocks going through her arms.  She is scared that she might have had a stroke and stroke was not checked.  She denied any other symptoms at this time apart from the shockwave in her arms.  She denied headache, dizziness, loss of consciousness and syncope.    Phone call duration:  17 minutes    Theo Bean CMA

## 2021-06-12 NOTE — PROGRESS NOTES
Bariatric Care Clinic Follow Up Visit for Previous Bariatric Surgery   Date of visit: 8/14/2017  Physician: Kalpesh Sierra MD  Primary Care is Donte Meraz DO.  Mackenzie Felix   42 y.o.  female    Date of Surgery: 9/11/13  Initial Weight: 308 lbs  Initial BMI: 46.7  Today's Weight:   Wt Readings from Last 1 Encounters:   08/14/17 (!) 267 lb (121.1 kg)     Body mass index is 40.6 kg/(m^2).  Weight: 267 lb (121.1 kg)     Assessment and Plan   Assessment: Mackenzie is a 42 y.o. year old female who is nearly 4 years s/p  Sleeve Gastrectomy with Dr. Camilo.  She has had a durable weight loss of 41 lbs since surgery and down 10 lbs from our first visit together last Fall (10/5/16).  Overall compliance with the [facility] Bariat[facility] Surgery Program has been poor in the past and complicated by incarceration, drug/alcohol abuse for which she was at [employer] at our last visit.  She'd fallen away from both good bariatric technique and vitamin supplementation.  Her Upper GI last Fall demonstrated a large hiatal hernia with reflux to the thoracic inlet and given her recurrent sx, EGD evaluation seems warranted. Whether a surgical procedure is needed for her hiatal hernia would be to be determined by her endoscopic evaluation/surgeon consultation.      Mackenzie Felix feels that she has not achieved her   preoperative goals for bariatric surgery.    Plan:  1. Great job with the new life/job and situation. You've lost 10 lbs since our last visit.    2. Given the reflux and hiatal hernia, evaluation of your esophagus with upper endoscopy is a good idea, you can schedule with Dr. Esteban or Dr. Archer to look and make sure your esophagus is not overly inflammed. Discussed with Dr. Esteban today who agreed that evaluation was warranted.    3. Start taking your vitamins and get your labs done at the [facility] clinic of your choosing.  4. Referral to dietician to get back on track with the eating routine.  5. Increase  omeprazole to 40mg BID.       1. Obesity, Class III, BMI 40-49.9 (morbid obesity)  Lipid Profile   2. History of sleeve gastrectomy  Amb Referral for Bariatric Endoscopy    Vitamin D, Total (25-Hydroxy)    Parathyroid Hormone Intact    Amb Referral to Bariatric Dietician    omeprazole (PRILOSEC) 40 MG capsule    cholecalciferol, vitamin D3, (VITAMIN D3) 5,000 unit Tab    multivitamin Chew    cyanocobalamin, vitamin B-12, 1,000 mcg Subl   3. Hx of gastroesophageal reflux (GERD)  Amb Referral for Bariatric Endoscopy    omeprazole (PRILOSEC) 40 MG capsule   4. Hiatal hernia  Amb Referral for Bariatric Endoscopy    omeprazole (PRILOSEC) 40 MG capsule   5. Low vitamin B12 level  Vitamin B12    cyanocobalamin, vitamin B-12, 1,000 mcg Subl   6. Malabsorption  HM2(CBC w/o Differential)    Vitamin D, Total (25-Hydroxy)    Vitamin A    Thiamin (Vitamin B1), Whole Blood    Ferritin    Zinc, Serum    Folate, Serum    cholecalciferol, vitamin D3, (VITAMIN D3) 5,000 unit Tab    multivitamin Chew    cyanocobalamin, vitamin B-12, 1,000 mcg Subl   7. Weight gain  Amb Referral for Bariatric Endoscopy    Comprehensive Metabolic Panel    Thyroid Stimulating Hormone (TSH)    Amb Referral to Bariatric Dietician   8. Vitamin D deficiency   Vitamin D, Total (25-Hydroxy)   9. Postoperative malabsorption  cyanocobalamin, vitamin B-12, 1,000 mcg Subl       Return in about 1 year (around 8/14/2018).     Bariatric Surgery Review   Interim History/LifeChanges: now graduated from [employer], working regularly.     Patient Concerns: still has GERD sx.     Medication changes: off Depo Provera    Vitamin Intake:   Multivitamin   occasional   Vitamin D  occasional   Calcium  none   Vit. B-12    rarely.     Habits:            Alcohol Intake  no, sober now   NSAID Use  avoids   Caffeine Use  rare   Exercise  some walking   CPAP Use:  yes. GERD makes it hard.   Birth Control  no.                                            LABS:  ordered      LABS:  Lab Results   Component Value Date    WBC 15.1 (H) 03/22/2010    HGB 10.8 (L) 01/07/2011    HCT 40.4 03/22/2010    MCV 86 03/22/2010     01/08/2011      No results found for: [id number] No results found for: HGBA1C   No results found for: CHOL No results found for: PTH      No results found for: FERRITIN   No results found for: HDL   No results found for: [id number] No results found for: 11009   No results found for: LDLCALC No results found for: TSH No results found for: FOLATE   No results found for: TRIG Lab Results   Component Value Date    ALT 6 01/07/2011    AST 15 01/07/2011    No results found for: TESTOSTERONE     No components found for: CHOLHDL No results found for: 7597   @resusfast(vitamin a: 1)@          Patient Profile   Social History     Social History Narrative        Past Medical History   Past Medical History:   Diagnosis Date     Active smoker      Anxiety disorder      Asthma      Bipolar affective     vs ADD     Cervical disc disease      Diabetes mellitus      GERD (gastroesophageal reflux disease)      Low back pain      Obesity      Sleep apnea     CPAP     Substance abuse     methamphetamine, sober since 11/28/15 [employer] resident currently.     Patient Active Problem List   Diagnosis     Back Pain     S/P laparoscopic sleeve gastrectomy     Diabetes     BENTON (obstructive sleep apnea)     Current Outpatient Prescriptions   Medication Sig Note     albuterol (PROVENTIL HFA;VENTOLIN HFA) 90 mcg/actuation inhaler Inhale 2 puffs 2 (two) times a day.      cyanocobalamin, vitamin B-12, 1,000 mcg Subl Place 1 tablet (1,000 mcg total) under the tongue daily. Post Bariatric surgery, malabsorption.      escitalopram oxalate (LEXAPRO) 20 MG tablet Take 20 mg by mouth daily.      FLOVENT DISKUS 250 mcg/actuation DsDv Inhale 1 puff as needed. 8/14/2017: Received from: External Pharmacy Received Sig: INL 2 PFS INTO THE LUNGS BID     traZODone (DESYREL) 50 MG tablet Take  "1 tablet by mouth daily. 8/14/2017: Received from: External Pharmacy Received Sig: TK 1-2 TS PO QHS PRN  FOR SLEEP     cholecalciferol, vitamin D3, (VITAMIN D3) 5,000 unit Tab Take 1 tablet (5,000 Units total) by mouth daily.      multivitamin Chew Chew 1 tablet 2 times a day at 6:00 am and 4:00 pm. Post bariatric surgery supplementation due to nutrient malabsorption.      omeprazole (PRILOSEC) 40 MG capsule Take 1 capsule (40 mg total) by mouth 2 (two) times a day.        Past Surgical History  She has a past surgical history that includes Tonsillectomy; Knee arthroscopy (Right, 1995); lap sleeve gastroplasty (09/11/2013); and Small intestine surgery.     Examination   /75  Pulse 68  Resp 18  Ht 5' 8\" (1.727 m)  Wt (!) 267 lb (121.1 kg)  SpO2 100%  Breastfeeding? No  BMI 40.6 kg/m2  Height: 5' 8\" (1.727 m) (8/14/2017  1:47 PM)  Weight: 267 lb (121.1 kg) (8/14/2017  1:47 PM)  BMI (Calculated): 40.6 (8/14/2017  1:47 PM)  SpO2: 100 % (8/14/2017  1:47 PM)  General:  Alert and ambulatory,   HEENT:  No conjunctival pallor, moist mucous Membranes, poor dentition.  Pulmonary:  Normal respiratory effort, no cough, no audible wheezes/crackles.  CV: Regular rate/rhythm, no murmur.  Abdominal: obese.  Extremities: no tremor, normal gait.  Skin:  Warm/dry without pallor or jaundice.  Pscyh/Mood: pleasant, mildly blunted.         Counseling:   We reviewed the important post op bariatric recommendations:  -eating 3 meals daily  -eating protein first, getting >60gm protein daily  -eating slowly, chewing food well  -avoiding/limiting calorie containing beverages  -drinking water 15-30 minutes before or after meals  -limiting restaurant or cafeteria eating to twice a week or less    We discussed the importance of restorative sleep and stress management in maintaining a healthy weight.  We discussed the National Weight Control Registry healthy weight maintenance strategies and ways to optimize metabolism.  We discussed " the importance of physical activity including cardiovascular and strength training in maintaining a healthier weight.  We discussed the importance of life-long vitamin supplementation and life-long  follow-up.    Mackenzie was reminded that, to avoid marginal ulcers she should avoid tobacco at all, alcohol in excess, caffeine in excess, and NSAIDS (unless indicated for cardioprotection or othewise and opposed by a PPI).    At least 40 minutes was spent in direct consultation and over 50% of the time devoted to counseling regarding maximizing the benefits of her previous bariatric surgery while minimizing risks of nutritional or structural complications.    Kalpesh Sierra MD  [facility].  8/14/2017  1:49 PM

## 2021-06-12 NOTE — ANESTHESIA PREPROCEDURE EVALUATION
Anesthesia Evaluation      Patient summary reviewed   No history of anesthetic complications     Airway   Mallampati: I  Neck ROM: full   Pulmonary    (+) asthma   poorly controlled, sleep apnea, wheezes,     ROS comment: Very recent asthma exacerbation with ED visit one week ago. Completed a 5-day prednisone course but still has some wheezing and cough. Has not been able to refill her inhaled corticosteroid for the past week unfortunately.   PE comment: Coughs with deep breathing                         Cardiovascular - negative ROS and normal exam   Neuro/Psych      Comments: Bipolar disorder  Hx meth use    Endo/Other    (+) diabetes mellitus, obesity,      GI/Hepatic/Renal    (+) hiatal hernia, GERD (well controlled with omeprazole, gets symptoms if she doesn't take it),             Dental    (+) poor dentition and lower dentures                       Anesthesia Plan    Patient's pulmonary status is not optimized. Discussed with patient the significant risk of intraoperative bronchospasm given her very recent asthma exacerbation and ongoing symptoms and lack of inhaled corticosteroid treatment for the past week. Patient agreed to cancel case, with plan to optimize her asthma management prior to rescheduling.

## 2021-06-16 NOTE — TELEPHONE ENCOUNTER
Left a message for the patient because we received a refill request on her omeprazole 40 mg, 90 day supply.  We last saw her in clinic 8/2019 and it looks like she was supposed to have an EGD with Dr. Esteban that was cancelled in September 2019 as well.  Encouraged her to call us to see how she is doing on the medication and if she is still needing it, in addition to scheduling an annual follow-up with some labs.  Vee Monroe RN     Patient called back and had some issues getting through to the nurse line and when I called her back, she said that she was too upset to talk and she would call back the next day.  I waited two days to give her a call and then left her a message.    Vee Monroe RN

## 2021-06-17 NOTE — PROGRESS NOTES
FEMALE PREVENTATIVE EXAM    Assessment and Plan:   1. Encounter to establish care  2. Annual physical exam  Healthy female exam.  Mildly elevated diastolic blood pressure.  Encourage patient to reduce salt intake and increase aerobic exercise and consume heart healthy diet.  Follow-up in 2 weeks for blood pressure recheck.  - Pregnancy, Urine  - Lipid Cascade; Future  - Comprehensive Metabolic Panel; Future    3. Diabetes  Controlled with diet and exercise   - Thyroid Stimulating Hormone (TSH); Future  - Glycosylated Hemoglobin A1c    4. Encounter for initial prescription of injectable contraceptive  Negative pregnancy. No history of blood clot disorder  - medroxyPROGESTERone injection 150 mg (DEPO-PROVERA); Inject 1 mL (150 mg total) into the shoulder, thigh, or buttocks every 3 (three) months.    5. Class 2 obesity due to excess calories without serious comorbidity with body mass index (BMI) of 36.0 to 36.9 in adult  Encourage daily aerobic exercise and healthy diet.   - Vitamin D, Total (25-Hydroxy); Future  - HM2(CBC w/o Differential); Future    Immunization Review  Adult Imm Review: No immunizations due today  BMI: 36.22  Documented tobacco use.  Website and phone contact for QuitPlan given to patient in AVS.    I discussed the following with the patient:   Adult Healthy Living: Importance of regular exercise  Healthy nutrition  Getting adequate sleep  Stress management    I have had an Advance Directives discussion with the patient.    Subjective:   Chief Complaint: Mackenzie Felix is an 42 y.o. female here for a preventative health visit.     HPI:   Mackenzie Felix, 42 years old female patient with history of obesity treated with weight loss surgery, gastroesophageal reflux disease, diabetes mellitus, anxiety disorder, active smoker, substance abuse, sleep apnea, hiatal hernia, and asthma.  Patient presented to the clinic today to establish care and to have a physical.  Patient also presented with a  "letter from the DMV requesting providers evaluation of present physical condition, particularly as to any blackout episode which resulted in loss of consciousness or voluntary control.  Patient has no other concerns.    Healthy Habits  Are you taking a daily aspirin? No  Do you typically exercising at least 40 min, 3-4 times per week?  NO  Do you usually eat at least 4 servings of fruit and vegetables a day, include whole grains and fiber and avoid regularly eating high fat foods? NO  Have you had an eye exam in the past two years? Yes  Do you see a dentist twice per year? NO  Do you have any concerns regarding sleep? YES, she has sleep apnea    Safety Screen  If you own firearms, are they secured in a locked gun cabinet or with trigger locks? The patient does not own any firearms  Do you feel you are safe where you are living?: Yes (8/21/2017  8:04 AM)  Do you feel you are safe in your relationship(s)?: Yes (8/21/2017  8:04 AM)    Review of Systems:  Please see above.  The rest of the review of systems are negative for all systems.     Pap History:   No - age 30-65 PAP every 3 years recommended. Patient refused  Cancer Screening       Status Date      PAP SMEAR Overdue 6/12/2004           Patient Care Team:  BRENDAN Castellanos as PCP - General (Nurse Practitioner)        History     Not marked as reviewed during this visit.            Objective:   Vital Signs:   Visit Vitals     BP (!) 126/94 (Patient Site: Right Arm, Patient Position: Sitting, Cuff Size: Adult Large)     Pulse 72     Temp 98.2  F (36.8  C)     Ht 5' 8\" (1.727 m)     Wt (!) 238 lb 3.2 oz (108 kg)     SpO2 98%     BMI 36.22 kg/m2          PHYSICAL EXAM  BP (!) 110/92 (Patient Site: Right Arm, Patient Position: Sitting, Cuff Size: Adult Large)  Pulse 72  Temp 98.2  F (36.8  C)  Ht 5' 8\" (1.727 m)  Wt (!) 238 lb 3.2 oz (108 kg)  SpO2 98%  BMI 36.22 kg/m2  General appearance: alert, appears stated age and cooperative  Head: Normocephalic, " without obvious abnormality, atraumatic  Eyes: conjunctivae/corneas clear. PERRL, EOM's intact. Fundi benign.  Ears: normal TM's and external ear canals both ears  Nose: Nares normal. Septum midline. Mucosa normal. No drainage or sinus tenderness.  Throat: lips, mucosa, and tongue normal; teeth and gums normal  Neck: no adenopathy, no carotid bruit, no JVD, supple, symmetrical, trachea midline and thyroid not enlarged, symmetric, no tenderness/mass/nodules  Lungs: clear to auscultation bilaterally  Heart: regular rate and rhythm, S1, S2 normal, no murmur, click, rub or gallop  Extremities: extremities normal, atraumatic, no cyanosis or edema  Pulses: 2+ and symmetric  Skin: Skin color, texture, turgor normal. No rashes or lesions  Lymph nodes: Cervical, supraclavicular, and axillary nodes normal.  Neurologic: Grossly normal      The ASCVD Risk score (Walter DC Jr, et al., 2013) failed to calculate for the following reasons:    Cannot find a previous HDL lab    Cannot find a previous total cholesterol lab         Medication List          These changes are accurate as of 4/23/18 11:01 AM.  If you have any questions, ask your nurse or doctor.               CHANGE how you take these medications          traZODone 50 MG tablet   Also known as:  DESYREL   INSTRUCTIONS:  Take 50 mg by mouth at bedtime as needed for sleep. Takes in addition to scheduled 50 mg   What changed:  Another medication with the same name was removed. Continue taking this medication, and follow the directions you see here.   Changed by:  BRENDAN Castellanos             CONTINUE taking these medications          acetaminophen 325 MG tablet   Also known as:  TYLENOL   INSTRUCTIONS:  Take 650 mg by mouth every 6 (six) hours as needed for pain.           albuterol 90 mcg/actuation inhaler   Also known as:  PROAIR HFA;PROVENTIL HFA;VENTOLIN HFA   INSTRUCTIONS:  Inhale 2 puffs every 4 (four) hours as needed for shortness of breath.            cholecalciferol (vitamin D3) 5,000 unit Tab   INSTRUCTIONS:  TK ONE T PO   D           cyanocobalamin (vitamin B-12) 1,000 mcg Subl   INSTRUCTIONS:  Place 1 tablet (1,000 mcg total) under the tongue daily. Post Bariatric surgery, malabsorption.           ibuprofen 200 MG tablet   Also known as:  ADVIL,MOTRIN   INSTRUCTIONS:  Take 600 mg by mouth every 6 (six) hours as needed for pain.           multivitamin Chew   INSTRUCTIONS:  Chew 1 tablet 2 times a day at 6:00 am and 4:00 pm. Post bariatric surgery supplementation due to nutrient malabsorption.           omeprazole 40 MG capsule   Also known as:  PriLOSEC   INSTRUCTIONS:  Take 1 capsule (40 mg total) by mouth 2 (two) times a day.             STOP taking these medications          benzonatate 100 MG capsule   Also known as:  TESSALON   Stopped by:  BRENDAN Castellanos       escitalopram oxalate 20 MG tablet   Also known as:  LEXAPRO   Stopped by:  BRENDAN Castellanos       FLOVENT DISKUS 250 mcg/actuation Dsdv   Generic drug:  fluticasone   Stopped by:  BRENDAN Castellanos             Additional Screenings Completed Today:

## 2021-06-18 NOTE — PROGRESS NOTES
Assessment:   1. Intractable migraine without aura and with status migrainosus  Recommended episodic treatment of headaches but patient declines as she does not have prescription coverage. I encouraged her to pick OTC Excedrin Migraine. Patient verbalized understanding of these issues, agrees with the plan and all questions were answered today. Patient was given an opportuntity to voice any other symptoms or concerns not listed above. Patient did not have any other symptoms or concerns.  - ondansetron injection 4 mg (ZOFRAN); Infuse 2 mL (4 mg total) into a venous catheter once.  - ketorolac injection 30 mg (TORADOL); Infuse 1 mL (30 mg total) into a venous catheter once.     Plan:   Continue present treatment and plan.  Lie in darkened room and apply cold packs as needed for pain.  Side effect profile discussed in detail.  Asked to keep headache diary.  Patient reassured that neurodiagnostic workup not indicated from benign H&P.  Follow up in 7 days.     Subjective:   Mackenzie Felix is a 42 y.o. female who presents for evaluation of headache. Symptoms began about 3 days ago. Generally, the headaches is constant with few breaks in between. The headaches do not seem to be related to any time of the day. The headaches are usually poorly described and are located in the entire head.  The patient rates her most severe headaches a 7 on a scale from 1 to 10. Recently, the headaches have been increasing in frequency. Work attendance or other daily activities are affected by the headaches. Precipitating factors include: none which have been determined. The headaches are usually not preceded by an aura. Associated neurologic symptoms: muscle weakness, vomiting in the early morning and nausea. The patient denies depression, loss of balance, speech difficulties, vision problems and worsening school/work performance. Home treatment has included acetaminophen and ibuprofen with little improvement. Other history includes:  headaches of unknown type diagnosed in the past. Family history includes no known family members with significant headaches.    The following portions of the patient's history were reviewed and updated as appropriate: allergies, current medications, past family history, past medical history, past social history, past surgical history and problem list.    Review of Systems  A 12 point comprehensive review of systems was negative except as noted.      Objective:      /88 (Patient Site: Right Arm, Patient Position: Sitting, Cuff Size: Adult Regular)  Pulse 60  Temp 99  F (37.2  C)  Wt (!) 232 lb 12.8 oz (105.6 kg)  SpO2 98%  BMI 35.4 kg/m2  General appearance: alert, appears stated age and cooperative  Head: Normocephalic, without obvious abnormality, atraumatic  Eyes: conjunctivae/corneas clear. PERRL, EOM's intact. Fundi benign.  Pulses: 2+ and symmetric  Skin: Skin color, texture, turgor normal. No rashes or lesions  Lymph nodes: Cervical, supraclavicular, and axillary nodes normal.  Neurologic: Grossly normal

## 2021-06-19 NOTE — LETTER
Letter by Apolonia Pickett FNP at      Author: Apolonia Pickett FNP Service: -- Author Type: --    Filed:  Encounter Date: 9/20/2019 Status: (Other)         Mackenzie Felix  [address] [place] [id number]             September 20, 2019         Dear Ms. Felix,    Below are the results from your recent visit:    Resulted Orders   Thyroid Stimulating Hormone (TSH)   Result Value Ref Range    TSH 0.81 0.30 - 5.00 uIU/mL        Normal thyroid level    Please call with questions or contact us using [employer].    Sincerely,        Electronically signed by BRENDAN Castellanos

## 2021-06-19 NOTE — PROGRESS NOTES
Office Visit - Follow Up   Mackenzie Felix   43 y.o. female    Date of Visit: 8/14/2018    Chief Complaint   Patient presents with     Medication Refill        Assessment and Plan   1. Postoperative malabsorption  2. Low vitamin B12 level  3. History of sleeve gastrectomy  Due to history of sleeve gastrectomy, restart vitamin B12  - cyanocobalamin, vitamin B-12, 1,000 mcg Subl; Place 1 tablet (1,000 mcg total) under the tongue daily. Post Bariatric surgery, malabsorption.  Dispense: 30 tablet; Refill: 11    4. MICHAEL (generalized anxiety disorder)  Restart escitalopram and follow up in 4 weeks for reassessment  - escitalopram oxalate (LEXAPRO) 20 MG tablet; Take 1 tablet (20 mg total) by mouth daily.  Dispense: 90 tablet; Refill: 3  - traZODone (DESYREL) 50 MG tablet; Take 1 tablet (50 mg total) by mouth at bedtime. Takes in addition to scheduled 50 mg  Dispense: 90 tablet; Refill: 3    5. Encounter for surveillance of contraceptive pills  - medroxyPROGESTERone injection 150 mg (DEPO-PROVERA); Inject 1 mL (150 mg total) into the shoulder, thigh, or buttocks once.    9. Diabetes (H)  In remission,   - Glycosylated Hemoglobin A1c  - cholecalciferol, vitamin D3, 5,000 unit Tab; Take 1 tablet (5,000 Units total) by mouth daily.; Refill: 0  - Glucose  - Microalbumin, Random Urine; Future    The following high BMI interventions were performed this visit: encouragement to exercise and dietary management education, guidance, and counseling       History of Present Illness   This 43 y.o. old female patient with history of status post laparoscopic sleeve gastrectomy, diabetes type 2, obstructive sleep apnea, class II obesity, and generalized anxiety disorder presented to the clinic today for follow-up.  Patient stated that she lost her prescription insurance and that she has not been taking her medications for over a month now.  She stated that she recently got her insurance and she would like to go back on her  "medications.  She would like to have a refill of her medications.  Patient has no other concerns today.  Patient declined getting tetanus vaccination.    Review of Systems: A comprehensive review of systems was negative except as noted.     Medications, Allergies and Problem List   Reviewed and updated     Physical Exam   General Appearance: well grromed    /78  Pulse 84  Ht 5' 8\" (1.727 m)  Wt (!) 239 lb 11.2 oz (108.7 kg)  SpO2 97%  BMI 36.45 kg/m2    /78  Pulse 84  Ht 5' 8\" (1.727 m)  Wt (!) 239 lb 11.2 oz (108.7 kg)  SpO2 97%  BMI 36.45 kg/m2  General appearance: alert, appears stated age and cooperative  Head: Normocephalic, without obvious abnormality, atraumatic  Pulses: 2+ and symmetric  Skin: Skin color, texture, turgor normal. No rashes or lesions  Lymph nodes: Cervical, supraclavicular, and axillary nodes normal.  Neurologic: Grossly normal       Additional Information   Current Outpatient Prescriptions   Medication Sig Dispense Refill     acetaminophen (TYLENOL) 325 MG tablet Take 650 mg by mouth every 6 (six) hours as needed for pain.       albuterol (PROVENTIL HFA;VENTOLIN HFA) 90 mcg/actuation inhaler Inhale 2 puffs every 4 (four) hours as needed for shortness of breath.        ibuprofen (ADVIL,MOTRIN) 200 MG tablet Take 600 mg by mouth every 6 (six) hours as needed for pain.       multivitamin Chew Chew 1 tablet 2 times a day at 6:00 am and 4:00 pm. Post bariatric surgery supplementation due to nutrient malabsorption. 100 each 4     omeprazole (PRILOSEC) 40 MG capsule Take 1 capsule (40 mg total) by mouth 2 (two) times a day. 180 capsule 2     traZODone (DESYREL) 50 MG tablet Take 50 mg by mouth at bedtime as needed for sleep. Takes in addition to scheduled 50 mg       cholecalciferol, vitamin D3, 5,000 unit Tab TK ONE T PO   D       cyanocobalamin, vitamin B-12, 1,000 mcg Subl Place 1 tablet (1,000 mcg total) under the tongue daily. Post Bariatric surgery, malabsorption. 30 " tablet 11     escitalopram oxalate (LEXAPRO) 20 MG tablet        Current Facility-Administered Medications   Medication Dose Route Frequency Provider Last Rate Last Dose     medroxyPROGESTERone injection 150 mg (DEPO-PROVERA)  150 mg Intramuscular Q3 Months BRENDAN Castellanos   150 mg at 04/23/18 0936     Facility-Administered Medications Ordered in Other Visits   Medication Dose Route Frequency Provider Last Rate Last Dose     famotidine 20 mg/2 mL injection 20 mg (PEPCID)  20 mg Intravenous Q12H Shelley Montana CNP         Allergies   Allergen Reactions     Propoxyphene N-Acetaminophen Itching     Social History   Substance Use Topics     Smoking status: Current Some Day Smoker     Packs/day: 0.50     Smokeless tobacco: Never Used      Comment: Patient quit in 2015 and restarted 6 months ago     Alcohol use No     Time: total time spent with the patient was 15 minutes of which >50% was spent in counseling and coordination of care     Apolonia Pickett, CNP

## 2021-06-19 NOTE — LETTER
Letter by Apolonia Pickett FNP at      Author: Apolonia Pickett FNP Service: -- Author Type: --    Filed:  Encounter Date: 9/19/2019 Status: (Other)         September 19, 2019     Patient: Mackenzie Felix   YOB: 1975   Date of Visit: 9/19/2019       To Whom It May Concern:    In connection with the [facility] Department of Public Safety, is my medical opinion that Isidro Moreira is safe to operate a motor vehicle as evidenced by a normal physical and lab examination which has shown no evidence of blackout or loss of consciousness.    If you have any questions or concerns, please don't hesitate to call.    Sincerely,        Electronically signed by Apolonia Pickett DNP, FNP

## 2021-06-19 NOTE — LETTER
Letter by Apolonia Pickett FNP at      Author: Apolonia Pickett FNP Service: -- Author Type: --    Filed:  Encounter Date: 9/19/2019 Status: (Other)         September 19, 2019     Patient: Mackenzie Felix   YOB: 1975   Date of Visit: 9/19/2019       To Whom It May Concern:    In connection with the [facility] Department of Public Safety, is my medical opinion that Mackenzie Felix is safe to operate a motor vehicle as evidenced by a normal physical and lab examination. Her Blood sugar has been well controlled with A1C of 5.7. She is currently not taking any medication that will affect her blood sugar. This statement does not need to be renewed except the DMV thinks otherwise.     If you have any questions or concerns, please don't hesitate to call.    Sincerely,        Electronically signed by BRENDAN Castellanos

## 2021-06-19 NOTE — LETTER
Letter by Apolonia Pickett FNP at      Author: Apolonia Pickett FNP Service: -- Author Type: --    Filed:  Encounter Date: 5/13/2019 Status: (Other)         Mackenzie Felix  [address]  [facility] [address]             May 13, 2019         Dear Ms. Felix,    I just wanted to sen you a friendly reminder that you are due for your annual diabetic check.  Please use my chart or call the clinic at [phone] and schedule at your earliest convenience.    I just wanted to let you know that we do not have record of your annual eye exam.  If you have had  your annual eye exam, please let us know using my chart or call the clinic at [phone].  If you  have not, this is just a friendly reminder that you are due.    Please call with questions or contact us using [employer].    Sincerely,        Electronically signed by BRENDAN Castellanos

## 2021-06-19 NOTE — PROGRESS NOTES
Impression:  Rash on the right cheek.  Fungal infection versus shingles.  May have been exacerbated by the CPAP mask and the steroid cream    Plan:  Discontinue the steroid cream, clotrimazole cream twice per day, Valtrex for possible shingles, follow-up with dermatology if not improved in 4-5 days      Chief Complaint:  Chief Complaint   Patient presents with     Rash     right cheek, x1 week, getting larger/worse, burning         HPI:   Mackenzie Felix is a 43 y.o. female who presents to this clinic for the evaluation of rash on the right cheek.  Patient complains of a one-week history of gradually worsening rash on the right cheek.  It is erythematous.  It has been spreading over the past 24 hours.  It started out as a small area in the inferior aspect of the nasal labial fold and has been progressing up over the cheek.  It is burning and discomfort which is mild.  No other rash.  No previous similar symptoms.  She uses a CPAP mask which creates pressure over the area of the nasolabial fold.  She has diabetes but it is in remission after bariatric surgery.  She tried some steroid cream with no improvement.      PMH:   Past Medical History:   Diagnosis Date     Active smoker      Anxiety      Anxiety disorder      Asthma      Cervical disc disease      Diabetes mellitus (H)      GERD (gastroesophageal reflux disease)      Hiatal hernia      Low back pain      Obesity, morbid, BMI 40.0-49.9 (H)      Sleep apnea     CPAP     Substance abuse     methamphetamine, sober since 11/28/15 [facility] resident currently.     Past Surgical History:   Procedure Laterality Date     GASTRECTOMY LAPAROSCOPIC SLEEVE  09/11/2013    Dr. Bairon Camilo     KNEE ARTHROSCOPY Right 1995     TONSILLECTOMY           ROS:    All other systems negative    Meds:    Current Outpatient Prescriptions:      acetaminophen (TYLENOL) 325 MG tablet, Take 650 mg by mouth every 6 (six) hours as needed for pain., Disp: , Rfl:      albuterol  (PROVENTIL HFA;VENTOLIN HFA) 90 mcg/actuation inhaler, Inhale 2 puffs every 4 (four) hours as needed for shortness of breath. , Disp: , Rfl:      cholecalciferol, vitamin D3, 5,000 unit Tab, TK ONE T PO   D, Disp: , Rfl:      cyanocobalamin, vitamin B-12, 1,000 mcg Subl, Place 1 tablet (1,000 mcg total) under the tongue daily. Post Bariatric surgery, malabsorption., Disp: 30 tablet, Rfl: 11     ibuprofen (ADVIL,MOTRIN) 200 MG tablet, Take 600 mg by mouth every 6 (six) hours as needed for pain., Disp: , Rfl:      multivitamin Chew, Chew 1 tablet 2 times a day at 6:00 am and 4:00 pm. Post bariatric surgery supplementation due to nutrient malabsorption., Disp: 100 each, Rfl: 4     omeprazole (PRILOSEC) 40 MG capsule, Take 1 capsule (40 mg total) by mouth 2 (two) times a day., Disp: 180 capsule, Rfl: 2     traZODone (DESYREL) 50 MG tablet, Take 50 mg by mouth at bedtime as needed for sleep. Takes in addition to scheduled 50 mg, Disp: , Rfl:     Current Facility-Administered Medications:      medroxyPROGESTERone injection 150 mg (DEPO-PROVERA), 150 mg, Intramuscular, Q3 Months, BRENDAN Castellanos, 150 mg at 04/23/18 0936    Facility-Administered Medications Ordered in Other Visits:      famotidine 20 mg/2 mL injection 20 mg (PEPCID), 20 mg, Intravenous, Q12H, Shelley Montana CNP        Social:  Social History     Social History     Marital status:      Spouse name: N/A     Number of children: N/A     Years of education: N/A     Occupational History     Not on file.     Social History Main Topics     Smoking status: Former Smoker     Packs/day: 0.50     Smokeless tobacco: Never Used      Comment: Patient quit in 2015 and restarted 6 months ago     Alcohol use No     Drug use: No     Sexual activity: Yes     Partners: Male     Birth control/ protection: Condom      Comment: depo     Other Topics Concern     Not on file     Social History Narrative         Physical Exam:  No distress  Skin: There is an  erythematous rash most pronounced in the right nasolabial fold with some serpiginous borders, there is some generalized erythema over the entire right cheek.  No definite vesicles, no ulcerations, no target lesions  Neuro: Normal motor and sensory function in all extremities  Psych: Awake, alert, normally responsive      Results:    No results found for this or any previous visit (from the past 24 hour(s)).    No results found.      Woo Wu MD

## 2021-06-20 NOTE — LETTER
Letter by Apolonia Pickett FNP at      Author: Apolonia Pickett FNP Service: -- Author Type: --    Filed:  Encounter Date: 1/8/2020 Status: Signed         January 8, 2020     Patient: Mackenzie Felix   YOB: 1975   Date of Visit: 1/8/2020       To Whom It May Concern:    Mackenzie Felix was seen in my clinic on 1/8/2020. It is my medical opinion that Mackenzie Felix may return to work on 1/9/2020.    If you have any questions or concerns, please don't hesitate to call.    Sincerely,        Electronically signed by BRENDAN Castellanos

## 2021-06-20 NOTE — LETTER
Letter by Apolonia Pickett FNP at      Author: Apolonia Pickett FNP Service: -- Author Type: --    Filed:  Encounter Date: 1/23/2020 Status: (Other)         January 23, 2020     Patient: Mackenzie Felix   YOB: 1975   Date of Visit: 1/23/2020       To Whom It May Concern:    Mackenzie Felix was seen in my clinic on 1/23/2020. It is my medical opinion that Mackenzie Felix may return to work on 1/24/2020.    If you have any questions or concerns, please don't hesitate to call.    Sincerely,        Electronically signed by BRENDAN Castellanos

## 2021-06-26 NOTE — PROGRESS NOTES
Progress Notes by Paul Marcelino PA-C at 8/16/2018 12:20 PM     Author: Paul Marcelino PA-C Service: -- Author Type: Physician Assistant    Filed: 8/16/2018  6:46 PM Encounter Date: 8/16/2018 Status: Signed    : Paul Marcelino PA-C (Physician Assistant)          Assessment and Plan     Mackenzie was seen today for headache.    Diagnoses and all orders for this visit:    Intractable chronic migraine without aura  -     ketorolac injection 30 mg (TORADOL); Inject 1 mL (30 mg total) into the shoulder, thigh, or buttocks once.  -     ondansetron disintegrating tablet 4 mg (ZOFRAN-ODT); Take 1 tablet (4 mg total) by mouth once.  -     SUMAtriptan (IMITREX) 25 MG tablet; Take 1 tablet (25 mg total) by mouth once as needed for migraine.       HPI     Chief Complaint   Patient presents with   ? Headache     x 2 days, constant pain       Mackenzie Felix is a 43 y.o. female seen today for a headache since yesterday afternoon.  Waxed and waned yesterday but has been fairly constant throughout the day today.  Pain is throbbing, around the left eye. The location and quality of her headache is identical to previous migraines.  Acknowledges significant photophobia and nausea without vomiting.  Denies visual disturbances.  She does typically have a brief aura which she did not note today.  She used to have sumatriptan but ran out and was unable to refill because her previous prescription drug plan would not cover it.  She has been taking Excedrin Migraine without relief.       Current Outpatient Prescriptions:   ?  acetaminophen (TYLENOL) 325 MG tablet, Take 650 mg by mouth every 6 (six) hours as needed for pain., Disp: , Rfl:   ?  albuterol (PROAIR HFA;PROVENTIL HFA;VENTOLIN HFA) 90 mcg/actuation inhaler, Inhale 2 puffs every 4 (four) hours as needed for shortness of breath., Disp: 18 g, Rfl: 3  ?  cholecalciferol, vitamin D3, 5,000 unit Tab, Take 1 tablet (5,000 Units total) by mouth daily., Disp: , Rfl:  0  ?  cyanocobalamin, vitamin B-12, 1,000 mcg Subl, Place 1 tablet (1,000 mcg total) under the tongue daily. Post Bariatric surgery, malabsorption., Disp: 30 tablet, Rfl: 11  ?  escitalopram oxalate (LEXAPRO) 20 MG tablet, Take 1 tablet (20 mg total) by mouth daily., Disp: 90 tablet, Rfl: 3  ?  ibuprofen (ADVIL,MOTRIN) 200 MG tablet, Take 600 mg by mouth every 6 (six) hours as needed for pain., Disp: , Rfl:   ?  omeprazole (PRILOSEC) 40 MG capsule, Take 1 capsule (40 mg total) by mouth 2 (two) times a day., Disp: 180 capsule, Rfl: 2  ?  traZODone (DESYREL) 50 MG tablet, TAKE 1 TABLET BY MOUTH AT BEDTIME IN ADDITION TO SCHEDULED 50MG TABLET, Disp: 180 tablet, Rfl: 3  ?  SUMAtriptan (IMITREX) 25 MG tablet, Take 1 tablet (25 mg total) by mouth once as needed for migraine., Disp: 6 tablet, Rfl: 0    Current Facility-Administered Medications:   ?  medroxyPROGESTERone injection 150 mg (DEPO-PROVERA), 150 mg, Intramuscular, Q3 Months, Promise C Amdavid, FNP, 150 mg at 04/23/18 0936     Reviewed and updated: medical history, medications and allergies.     Review of Systems     General: Denies fever, chills, fatigue.  GI: Acknowledges nausea.  Denies vomiting.  No diarrhea or constipation.     Objective     Vitals:    08/16/18 1240   BP: 122/80   Pulse: 73   Resp: 20   Temp: 98.6  F (37  C)   TempSrc: Oral   SpO2: 97%   Weight: (!) 239 lb 3.2 oz (108.5 kg)        Reviewed vital signs.  General: Appears calm, comfortable. Answers questions quickly and appropriately with clear speech. No apparent distress.  Skin: Pink, warm, dry.  Eyes: PERRL, EOMI. no pain with EOM.  HENT: Normocephalic, atraumatic.  Neck: Supple.  Cardiovascular: Strong, regular radial pulse.  Respiratory: Normal respiratory effort.  Neuro: Memory and cognition appear normal. Normal gait. Cranial nerves II - XII grossly intact.  Negative Romberg.  Negative pronator drift.  Psych: Mood and affect appear normal.     Imaging:   No results found.    Labs:  No  results found for this or any previous visit (from the past 24 hour(s)).     Medical Decision-Making     Mackenzie is an ill-appearing 43-year-old female who reports a headache consistent with her previous migraine pattern.  The headache is throbbing and peripherally located around her left orbit.  While she did not have her typical aura prior to the onset of this headache, everything else about it is identical to previous headaches.  Her appearance is nontoxic.  She is not tachycardic, tachypnea, or febrile.  Her neurologic exam is completely normal.  Administered IM ketorolac and ondansetron ODT.  After approximately 30 minutes her headache was almost completely resolved, states pain had dropped from 9/10 to 2/10.  We discussed the efficacy of NSAIDs for acute migraine and I recommended she try 400 mg of ibuprofen next time.  Additionally provided a small supply of sumatriptan, requesting that she follow-up with her PCP to discuss a long-term solution and possibly suppressive therapy if needed.    Reviewed red flags that would trigger a prompt return to the clinic as noted below under patient instructions.  She expressed understanding of these directions and is in agreement with the plan.     Patient Instructions     Patient Instructions   The treatment you received today in the clinic was ketorolac and ondansetron.  The ondansetron was just for the nausea.  The ketorolac is the medication that generally works well for migraines.  It is in the same family as ibuprofen and has been shown to be no more effective than ibuprofen so I recommend trying 400 mg of ibuprofen next time you have a headache.    Additionally I did send an order for 6 tablets of sumatriptan.  If this is something you would like to have on hand for the long-term, please follow-up with Promise Amajiri.      Please return to the clinic if you notice any of the following:    Headaches become much more severe.    Headache that comes on suddenly and  reaches maximum intensity in just a few minutes.    Facial droop.    Slurred speech.    Weakness on one side of your body.          Migraine Headache  This often severe type of headache is different from other types of headaches in that symptoms other than pain occur with the headache. Nausea and vomiting, lightheadedness, sensitivity to light (photophobia), and other visual disturbances are common migraine symptoms. The pain may last from a few hours to several days. It is not clear why migraines occur but certain factors called triggers can raise the risk of having a migraine attack. A migraine may be triggered by emotional stress or depression, or by hormone changes during the menstrual cycle. Other triggers include birth control pills, overuse of migraine medicines, alcohol or caffeine, foods with tyramine (such as aged cheese and wine), eyestrain, weather changes, missed meals, or too little or too much sleep.  Home care  Follow these tips when taking care of yourself at home:    Dont drive yourself home if you were given pain medicine for your headache or are having visual symptoms. Instead, have someone else drive you home. Try to sleep when you get home. You should feel much better when you wake up.    Cold can help ease migraine symptoms. Put an ice pack on your forehead or at the base of your skull. Put heat on the back of your neck to help ease any neck spasm.    Drink only clear liquids or eat a light diet until your symptoms get better. This will help you avoid nausea and vomiting.  How to prevent migraines  Pay attention to what seems to trigger your headache. Try to avoid the triggers when you can. If you have frequent headaches, consider keeping a headache diary. In it, write down what you were doing, feeling, or eating in the hours before each headache. Show this to your healthcare provider to help find the cause of your headaches.  If stress seems to be a trigger for your headaches, figure out what  is causing stress in your life. Learn new ways to handle your stress. Ideas include regular exercise, biofeedback, self-hypnosis, yoga, and meditation. Talk with your healthcare provider to find out more information about managing stress. Many books and digital media are also available on this subject.  Tyramine is a substance found in many foods. It can trigger a migraine in some people. These foods contain tyramine:    Chocolate    Yogurt    All cheeses, but especially aged cheeses    Smoked or pickled fish and meat, including herring, caviar, bologna, pepperoni, and salami    Liver    Avocados    Bananas    Figs    Raisins    Red wine  Try staying away from these foods for 1 to 2 months to see if you have fewer headaches.  How to treat future headaches    Take time out at the first sign of a headache, if possible. Find a quiet, dark, comfortable place to sit or lie down. Let yourself relax or sleep.    Put an ice pack on your forehead or on the area of greatest pain. A heating pad and massage may help if you are having a muscle spasm and tightness in your neck.    If you have been prescribed a medicine to stop a migraine headache, use this at the first warning sign of the headache for best results. First signs may be an aura or pain.    If you need to take medicine often for your migraine, talk with your healthcare provider about other ways to prevent your headaches.  Follow-up care  Follow up with your healthcare provider, or as advised. Talk with your provider if you have frequent headaches. He or she can figure out a treatment plan. Ask if you can have medicine to take at home the next time you get a bad headache. This may keep you from having to visit the emergency department in the future. You may need to see a headache specialist (neurologist) if you continue to have headaches.  When to seek medical advice  Call your healthcare provider right away if any of these occur:    Your head pain gets worse, or  doesnt get better within 24 hours    You cant keep liquids down (repeated vomiting)    Pain in your sinuses, ears, or throat    Fever of 100.4  F (38  C) or higher, or as directed by your healthcare provider    Stiff neck    Extreme drowsiness, confusion, or fainting    Dizziness, or dizziness with spinning sensation (vertigo)    Weakness in an arm or leg, or on one side of your face    Difficulty talking or seeing  Date Last Reviewed: 8/1/2016 2000-2017 [employer]. [address] [place] [address]. All rights reserved. This information is not intended as a substitute for professional medical care. Always follow your healthcare professional's instructions.            Discussed benefit vs risk of medications, dosing, side effects.  Patient was able to verbalize understanding.  After visit summary was provided for patient.     Jose Marcelino PA-C

## 2021-06-28 NOTE — PROGRESS NOTES
Progress Notes by Apolonia Pickett FNP at 9/19/2019  9:30 AM     Author: Apolonia Pickett FNP Service: -- Author Type: Nurse Practitioner    Filed: 9/19/2019 12:28 PM Encounter Date: 9/19/2019 Status: Signed    : Apolonia Pickett FNP (Nurse Practitioner)       FEMALE PREVENTATIVE EXAM    Assessment and Plan:   1. Annual physical exam  Healthy female exam    2. Morbid obesity (H)  Patient has gained 5 pounds from her last visit.  Her compliance with the [facility] bariatric surgery program has been poor and she continues to struggle with diet and taking her supplements.  Her recent labs results were unremarkable.  Encourage patient to continue to eat healthy and take her vitamins as prescribed.    3. MICHAEL (generalized anxiety disorder)  Mental health has been stable as she continues to take her Lexapro and trazodone.  - escitalopram oxalate (LEXAPRO) 20 MG tablet; Take 1 tablet (20 mg total) by mouth daily.  Dispense: 90 tablet; Refill: 3  - Thyroid Stimulating Hormone (TSH)  - albuterol (PROAIR HFA;PROVENTIL HFA;VENTOLIN HFA) 90 mcg/actuation inhaler; Inhale 2 puffs every 4 (four) hours as needed for shortness of breath.  Dispense: 18 g; Refill: 3  - traZODone (DESYREL) 50 MG tablet; Take 1 tablet (50 mg total) by mouth at bedtime.  Dispense: 90 tablet; Refill: 3    4. Visit for screening mammogram  Recently completed her mammogram which was normal    Next follow up:  No follow-ups on file.    Immunization Review  Adult Imm Review: Declines immunizations today  Documented tobacco use.  Website and phone contact for QuitPlan given to patient in AVS.    I discussed the following with the patient:   Adult Healthy Living: Importance of regular exercise  Healthy nutrition  Getting adequate sleep  Stress management  Use of seat belts  Distracted driving  STI prevention  Contraception options  Supplement use    I have had an Advance Directives discussion with the patient.    Subjective:   Chief Complaint:  "Mackenzie Felix is an 44 y.o. female here for a preventative health visit.     HPI: 6 years status post LSG, upper respiratory breathing disorder, sleep apnea, anxiety and depression.  Patient reported that her ex- was recently diagnosed with throat cancer and she is been spending time taking care of the kids and taking care of him.  She report that she had to leave her job just to take care of her  and the kids.  She plans to go back to work soon.  Patient also requested for letter for DMV to enable her to continue driving.    Healthy Habits  Are you taking a daily aspirin? Yes  Do you typically exercising at least 40 min, 3-4 times per week?  Yes  Do you usually eat at least 4 servings of fruit and vegetables a day, include whole grains and fiber and avoid regularly eating high fat foods? Yes  Have you had an eye exam in the past two years? Yes  Do you see a dentist twice per year? Yes  Do you have any concerns regarding sleep? No    Safety Screen  If you own firearms, are they secured in a locked gun cabinet or with trigger locks? The patient does not own any firearms  Do you feel you are safe where you are living?: Yes (11/19/2018  8:04 AM)  Do you feel you are safe in your relationship(s)?: Yes (11/19/2018  8:04 AM)      Review of Systems:  Please see above.  The rest of the review of systems are negative for all systems.     Pap History:   Yes - updated in Problem List and Health Maintenance accordingly  Cancer Screening       Status Date      MAMMOGRAM Overdue 1974     PAP SMEAR Overdue 6/12/2004           Patient Care Team:  Apolonia Pickett FNP as PCP - General (Nurse Practitioner)  Apolonia Pickett FNP as Assigned PCP        History     Not marked as reviewed during this visit.            Objective:   Vital Signs: There were no vitals taken for this visit.       PHYSICAL EXAM  /76   Pulse 66   Ht 5' 8\" (1.727 m)   Wt (!) 244 lb 5 oz (110.8 kg)   SpO2 96%   BMI 37.15 " kg/m    General appearance: alert, appears stated age and cooperative  Head: Normocephalic, without obvious abnormality, atraumatic  Eyes: conjunctivae/corneas clear. PERRL, EOM's intact. Fundi benign.  Ears: normal TM's and external ear canals both ears  Throat: lips, mucosa, and tongue normal; teeth and gums normal  Neck: no adenopathy, no carotid bruit, no JVD, supple, symmetrical, trachea midline and thyroid not enlarged, symmetric, no tenderness/mass/nodules  Back: symmetric, no curvature. ROM normal. No CVA tenderness.  Lungs: clear to auscultation bilaterally  Breasts: Defer  Heart: regular rate and rhythm, S1, S2 normal, no murmur, click, rub or gallop  Abdomen: soft, non-tender; bowel sounds normal; no masses,  no organomegaly  Pelvic: deferred  Extremities: extremities normal, atraumatic, no cyanosis or edema  Pulses: 2+ and symmetric  Skin: Skin color, texture, turgor normal. No rashes or lesions  Lymph nodes: Cervical, supraclavicular, and axillary nodes normal.  Neurologic: Grossly normal      The ASCVD Risk score ([employer] et al., 2013) failed to calculate for the following reasons:    The valid total cholesterol range is 130 to 320 mg/dL         Medication List           Accurate as of 9/19/19 10:34 AM. If you have any questions, ask your nurse or doctor.               CHANGE how you take these medications    traZODone 50 MG tablet  Also known as:  DESYREL  INSTRUCTIONS:  Take 1 tablet (50 mg total) by mouth at bedtime.  Doctor's comments:  **Patient requests 90 days supply**  What changed:  See the new instructions.  Changed by:  BRENDAN Castellanos           CONTINUE taking these medications    acetaminophen 325 MG tablet  Also known as:  TYLENOL  INSTRUCTIONS:  Take 650 mg by mouth every 6 (six) hours as needed for pain.        ADVAIR DISKUS 250-50 mcg/dose DISKUS  INSTRUCTIONS:  Inhale 1 puff 2 (two) times a day.  Generic drug:  fluticasone propion-salmeterol        albuterol 90  mcg/actuation inhaler  Also known as:  PROAIR HFA;PROVENTIL HFA;VENTOLIN HFA  INSTRUCTIONS:  Inhale 2 puffs every 4 (four) hours as needed for shortness of breath.  Doctor's comments:  May substitute the equivalent medication per insurance preference.        cholecalciferol (vitamin D3) 5,000 unit Tab  INSTRUCTIONS:  Once daily.        cyanocobalamin (vitamin B-12) 1,000 mcg Subl  INSTRUCTIONS:  Place 1 tablet (1,000 mcg total) under the tongue daily. Post Bariatric surgery, malabsorption.        escitalopram oxalate 20 MG tablet  Also known as:  LEXAPRO  INSTRUCTIONS:  Take 1 tablet (20 mg total) by mouth daily.        hydrocortisone 1 % cream  INSTRUCTIONS:  Apply 1 drop topically as needed.        ibuprofen 200 MG tablet  Also known as:  ADVIL,MOTRIN  INSTRUCTIONS:  Take 600 mg by mouth every 6 (six) hours as needed for pain.        omeprazole 40 MG capsule  Also known as:  PriLOSEC  INSTRUCTIONS:  Take 1 capsule (40 mg total) by mouth 2 (two) times a day.        triamcinolone 0.1 % ointment  Also known as:  KENALOG  INSTRUCTIONS:  Apply 1 application topically 2 (two) times a day.              Where to Get Your Medications      These medications were sent to [id number] - [place], [place] - [address] AT [place] [address] [address] [facility] [address]    Phone:  [phone]     albuterol 90 mcg/actuation inhaler    escitalopram oxalate 20 MG tablet    traZODone 50 MG tablet         Additional Screenings Completed Today:

## 2021-07-03 NOTE — ADDENDUM NOTE
Addendum Note by India Hurley, RN at 8/12/2019 12:30 PM     Author: India Hurley, RN Service: -- Author Type: Registered Nurse    Filed: 8/12/2019  2:36 PM Encounter Date: 8/12/2019 Status: Signed    : India Hurley RN (Registered Nurse)    Addended by: INDIA HURLEY on: 8/12/2019 02:36 PM        Modules accepted: Orders

## 2021-09-05 ENCOUNTER — HEALTH MAINTENANCE LETTER (OUTPATIENT)
Age: 46
End: 2021-09-05

## 2021-12-26 ENCOUNTER — HEALTH MAINTENANCE LETTER (OUTPATIENT)
Age: 46
End: 2021-12-26

## 2022-02-20 ENCOUNTER — HEALTH MAINTENANCE LETTER (OUTPATIENT)
Age: 47
End: 2022-02-20

## 2022-04-17 ENCOUNTER — HEALTH MAINTENANCE LETTER (OUTPATIENT)
Age: 47
End: 2022-04-17

## 2022-08-07 ENCOUNTER — HEALTH MAINTENANCE LETTER (OUTPATIENT)
Age: 47
End: 2022-08-07

## 2022-10-23 ENCOUNTER — HEALTH MAINTENANCE LETTER (OUTPATIENT)
Age: 47
End: 2022-10-23

## 2023-04-01 ENCOUNTER — HEALTH MAINTENANCE LETTER (OUTPATIENT)
Age: 48
End: 2023-04-01

## 2023-12-29 ENCOUNTER — HOSPITAL ENCOUNTER (EMERGENCY)
Facility: CLINIC | Age: 48
Discharge: HOME | End: 2023-12-29
Attending: EMERGENCY MEDICINE | Admitting: EMERGENCY MEDICINE
Payer: MEDICAID

## 2023-12-29 ENCOUNTER — APPOINTMENT (OUTPATIENT)
Dept: CT IMAGING | Facility: CLINIC | Age: 48
End: 2023-12-29
Attending: EMERGENCY MEDICINE
Payer: MEDICAID

## 2023-12-29 VITALS
HEIGHT: 67 IN | HEART RATE: 85 BPM | DIASTOLIC BLOOD PRESSURE: 82 MMHG | SYSTOLIC BLOOD PRESSURE: 132 MMHG | OXYGEN SATURATION: 98 % | RESPIRATION RATE: 16 BRPM | BODY MASS INDEX: 34.53 KG/M2 | WEIGHT: 220 LBS

## 2023-12-29 DIAGNOSIS — R51.9 NONINTRACTABLE HEADACHE, UNSPECIFIED CHRONICITY PATTERN, UNSPECIFIED HEADACHE TYPE: ICD-10-CM

## 2023-12-29 PROCEDURE — 96375 TX/PRO/DX INJ NEW DRUG ADDON: CPT | Performed by: EMERGENCY MEDICINE

## 2023-12-29 PROCEDURE — 99284 EMERGENCY DEPT VISIT MOD MDM: CPT | Performed by: EMERGENCY MEDICINE

## 2023-12-29 PROCEDURE — 258N000003 HC RX IP 258 OP 636: Performed by: EMERGENCY MEDICINE

## 2023-12-29 PROCEDURE — 70450 CT HEAD/BRAIN W/O DYE: CPT | Mod: 26 | Performed by: RADIOLOGY

## 2023-12-29 PROCEDURE — 250N000011 HC RX IP 250 OP 636: Performed by: EMERGENCY MEDICINE

## 2023-12-29 PROCEDURE — 70450 CT HEAD/BRAIN W/O DYE: CPT

## 2023-12-29 PROCEDURE — 99285 EMERGENCY DEPT VISIT HI MDM: CPT | Mod: 25 | Performed by: EMERGENCY MEDICINE

## 2023-12-29 PROCEDURE — 96374 THER/PROPH/DIAG INJ IV PUSH: CPT | Performed by: EMERGENCY MEDICINE

## 2023-12-29 PROCEDURE — 96361 HYDRATE IV INFUSION ADD-ON: CPT | Performed by: EMERGENCY MEDICINE

## 2023-12-29 RX ORDER — OMEPRAZOLE 40 MG/1
40 CAPSULE, DELAYED RELEASE ORAL DAILY
Qty: 30 CAPSULE | Refills: 0 | Status: SHIPPED | OUTPATIENT
Start: 2023-12-29 | End: 2024-01-28

## 2023-12-29 RX ORDER — DIPHENHYDRAMINE HYDROCHLORIDE 50 MG/ML
25 INJECTION INTRAMUSCULAR; INTRAVENOUS ONCE
Status: COMPLETED | OUTPATIENT
Start: 2023-12-29 | End: 2023-12-29

## 2023-12-29 RX ORDER — METOCLOPRAMIDE HYDROCHLORIDE 5 MG/ML
10 INJECTION INTRAMUSCULAR; INTRAVENOUS ONCE
Status: COMPLETED | OUTPATIENT
Start: 2023-12-29 | End: 2023-12-29

## 2023-12-29 RX ADMIN — SODIUM CHLORIDE 1000 ML: 9 INJECTION, SOLUTION INTRAVENOUS at 10:18

## 2023-12-29 RX ADMIN — DIPHENHYDRAMINE HYDROCHLORIDE 25 MG: 50 INJECTION, SOLUTION INTRAMUSCULAR; INTRAVENOUS at 10:15

## 2023-12-29 RX ADMIN — METOCLOPRAMIDE 10 MG: 5 INJECTION, SOLUTION INTRAMUSCULAR; INTRAVENOUS at 10:18

## 2023-12-29 ASSESSMENT — ACTIVITIES OF DAILY LIVING (ADL): ADLS_ACUITY_SCORE: 35

## 2023-12-29 NOTE — DISCHARGE INSTRUCTIONS
Please make an appointment to follow up with Your Primary Care Provider in 2-3 days if you have any concerns.  If your symptoms significantly worsen please come back to the emergency department.  Your blood pressure in the emergency department was normal on several occasions.  At this point there is no need for anti-hypertensive medication prescription.  Please keep a close eye on your blood pressure over the next few weeks and follow-up with your primary care provider with any concerns.

## 2023-12-29 NOTE — ED PROVIDER NOTES
[facility] DE[facility]MENT ([facility])    12/29/23       ED PROVIDER NOTE   [facility]   History     Chief Complaint   Patient presents with    Hypertension     The history is provided by the patient and medical records.     Mackenzie Felix is a 48 year old female with history of generalized anxiety disorder, laparoscopic sleeve gastrectomy, asthma, sleep apnea who presents with elevated blood pressures as well as headache for 2 days.  Staff at her treatment center got BP readinngs of 249/129 and 160/120.  She endorses frontal headache at 6/10. She denies fevers, neck pain, tinnitus, rhinorrhea, sore throat, cough, shortness of breath, or chest pain.  No history of migraines.  No weakness or sensory changes.  No photophobia.  No other concerns or complaints. She is not on any antihypertensives.    This part of the document was transcribed by Gaby Mark, Medical Scribe.      Past Medical History  Past Medical History:   Diagnosis Date    Active smoker     Anxiety     Anxiety disorder     Asthma     Bariatric surgery status 09/11/2013    gastric sleeve    Cervical disc disease     Depressive disorder     on abilify and lexapro    Diabetes mellitus (H)     GERD (gastroesophageal reflux disease)     Hiatal hernia     Hypoglycemia     Low back pain     Methamphetamine use (H)     history    Obesity, morbid, BMI 40.0-49.9 (H)     [place] on CPAP     Sleep apnea     CPAP    Substance abuse (H)     methamphetamine, sober since 11/28/15 teen [facility] resident currently.     Past Surgical History:   Procedure Laterality Date    ARTHROSCOPY KNEE Right 1995    GASTRECTOMY LAPAROSCOPIC SLEEVE  09/11/2013    Dr. Bairon Camilo    GI SURGERY      sleeve gastrectomy 2013    TONSILLECTOMY       omeprazole (PRILOSEC) 40 MG DR capsule  ACETAMINOPHEN PO  albuterol (PROAIR HFA/PROVENTIL HFA/VENTOLIN HFA) 108 (90 BASE) MCG/ACT Inhaler  albuterol (PROAIR HFA/PROVENTIL HFA/VENTOLIN HFA) 108 (90 BASE) MCG/ACT  "Inhaler  Benzocaine (ORAJEL MT)  blood glucose monitoring (NO BRAND SPECIFIED) test strip  calcium carbonate (TUMS) 500 MG chewable tablet  clotrimazole (LOTRIMIN) 1 % cream  Cyanocobalamin (B-12) 1000 MCG SUBL  escitalopram (LEXAPRO) 20 MG tablet  escitalopram (LEXAPRO) 20 MG tablet  fluticasone (FLOVENT DISKUS) 250 MCG/BLIST AEPB Inhaler  glucose 4 G CHEW  hydrocortisone (CORTAID) 1 % cream  HYDROXYZINE HCL PO  loratadine (CLARITIN) 10 MG tablet  MEDROXYPROGESTERONE ACETATE PO  MELATONIN PO  NONFORMULARY  omeprazole (PRILOSEC) 40 MG capsule  omeprazole (PRILOSEC) 40 MG capsule  order for DME  VITAMIN D, CHOLECALCIFEROL, PO      Allergies   Allergen Reactions    Darvocet [Propoxyphene N-Apap] Hives     Family History  Family History   Problem Relation Age of Onset    Diabetes Mother     Other Cancer Father     Breast Cancer Father     Heart Disease Mother     Lung Cancer Father      Social History   Social History     Tobacco Use    Smoking status: Former     Types: Cigarettes     Quit date: 2019     Years since quittin.3    Smokeless tobacco: Current   Substance Use Topics    Alcohol use: No    Drug use: No         A medically appropriate review of systems was performed with pertinent positives and negatives noted in the HPI, and all other systems negative.    Physical Exam   BP: (P) 130/84  Pulse: (P) 88  Temp: (P) 98.6  F (37  C)  Resp: (P) 16  Height: 170.2 cm (5' 7\")  Weight: 99.8 kg (220 lb)  SpO2: (P) 97 %  Physical Exam  Vitals and nursing note reviewed.   Constitutional:       General: She is in acute distress (mild, from pain).      Appearance: She is not ill-appearing, toxic-appearing or diaphoretic.   HENT:      Head: Normocephalic and atraumatic.   Eyes:      Extraocular Movements: Extraocular movements intact.      Conjunctiva/sclera: Conjunctivae normal.      Pupils: Pupils are equal, round, and reactive to light.   Cardiovascular:      Rate and Rhythm: Normal rate.      Heart sounds: Normal " heart sounds.   Pulmonary:      Effort: Pulmonary effort is normal. No respiratory distress.      Breath sounds: Normal breath sounds. No wheezing.   Abdominal:      Palpations: Abdomen is soft.      Tenderness: There is no abdominal tenderness. There is no guarding.   Musculoskeletal:         General: No tenderness. Normal range of motion.      Cervical back: Normal range of motion and neck supple.      Right lower leg: No edema.      Left lower leg: No edema.   Skin:     General: Skin is warm.      Findings: No rash.   Neurological:      General: No focal deficit present.      Mental Status: She is alert and oriented to person, place, and time.      GCS: GCS eye subscore is 4. GCS verbal subscore is 5. GCS motor subscore is 6.      Cranial Nerves: Cranial nerves 2-12 are intact. No cranial nerve deficit.      Sensory: Sensation is intact. No sensory deficit.      Motor: Motor function is intact. No weakness.      Coordination: Coordination is intact. Coordination normal.      Gait: Gait is intact. Gait normal.      Deep Tendon Reflexes: Reflexes normal.      Comments: No dysarthria, dysmetria, diplopia, disdiadochokinesia. Strength 5/5 in b/l UEs with  and b/l LEs with dorsi- and plantar-flexion against resistance. Sensation to light touch and 2-point discrimination intact in b/l distal UEs and LEs. CNs II-XII intact. Negative Romberg. Normal gait.   Psychiatric:         Mood and Affect: Mood normal.         Behavior: Behavior normal.         Thought Content: Thought content normal.         Judgment: Judgment normal.       ED Course, Procedures, & Data      Procedures               Results for orders placed or performed during the hospital encounter of 12/29/23   CT Head w/o Contrast     Status: None    Narrative    CT HEAD W/O CONTRAST 12/29/2023 10:41 AM    History: headache   ICD-10:    Comparison: None    Technique: Using multidetector thin collimation helical acquisition  technique, axial, coronal and  sagittal CT images from the skull base  to the vertex were obtained without intravenous contrast.   (topogram) image(s) also obtained and reviewed.    Findings: There is no intracranial hemorrhage, mass effect, or midline  shift. Gray/white matter differentiation in both cerebral hemispheres  is preserved. Ventricles are proportionate to the cerebral sulci. The  basal cisterns are clear. Mild low-lying left cerebellar tonsil; no  herniation.    The bony calvaria and the bones of the skull base are normal. The  visualized portions of the paranasal sinuses and mastoid air cells are  clear.      Impression    Impression:  No acute intracranial pathology.     I have personally reviewed the examination and initial interpretation  and I agree with the findings.    JANEEN BHATT MD         SYSTEM ID:  [id number]     Medications   metoclopramide (REGLAN) injection 10 mg (10 mg Intravenous $Given 12/29/23 1018)   diphenhydrAMINE (BENADRYL) injection 25 mg (25 mg Intravenous $Given 12/29/23 1015)   sodium chloride 0.9% BOLUS 1,000 mL (0 mLs Intravenous Stopped 12/29/23 1120)     Labs Ordered and Resulted from Time of ED Arrival to Time of ED Departure - No data to display  CT Head w/o Contrast   Final Result   Impression:   No acute intracranial pathology.       I have personally reviewed the examination and initial interpretation   and I agree with the findings.      JANEEN BHATT MD            SYSTEM ID:  [id number]             Critical care was not performed.     Medical Decision Making  The patient's presentation was of high complexity (an acute health issue posing potential threat to life or bodily function).    The patient's evaluation involved:  ordering and/or review of 1 test(s) in this encounter (see separate area of note for details)  independent interpretation of testing performed by another health professional (see separate area of note for details)    The patient's management necessitated high risk (a  decision regarding hospitalization).    Assessment & Plan      48-year-old woman presenting with elevated blood pressures at her treatment facility and headache for 2 days.  Differential diagnosis: intracranial hemorrhage, brain tumor, secondary hypertension, unlikely primary hypertension.    After thorough history and physical exam patient appears to be in mild distress due to headache.  I will treat her headache with IV Reglan and IV Benadryl along with a bolus of IV saline.  I will obtain a CT of the head for further evaluation.  Patient's blood pressures in triage have normalized to 130s over 80s.  She also requested a prescription for omeprazole to be sent to her pharmacy since she ran out.  I will do this for her.    I reviewed patient's CT of the head and I read the radiology report; no evidence of intracranial hemorrhage or brain tumor.  Patient's headache significantly improved after IV medications in the emergency department.  Blood pressure remained stable without any need for antihypertensive medications.  At this point she is stable for discharge with outpatient follow-up and return if her symptoms worsen.  She agrees with the plan.  Gait is normal discharge.    This part of the document was transcribed by Gaby Mark, Medical Scribe.      I have reviewed the nursing notes. I have reviewed the findings, diagnosis, plan and need for follow up with the patient.    Discharge Medication List as of 12/29/2023 11:20 AM        START taking these medications    Details   !! omeprazole (PRILOSEC) 40 MG DR capsule Take 1 capsule (40 mg) by mouth daily for 30 days, Disp-30 capsule, R-0, E-Prescribe       !! - Potential duplicate medications found. Please discuss with provider.          Final diagnoses:   Nonintractable headache, unspecified chronicity pattern, unspecified headache type     I, Gaby Mark, am serving as a trained medical scribe to document services personally performed by Silvio Ramos MD,  based on the provider's statements to me.  This document has been checked and approved by the attending provider.     I, Silvio Ramos MD, was physically present and have reviewed and verified the accuracy of this note documented by Gaby Mark medical scribe.     Silvio Ramos MD   [facility] EMERGENCY DEPARTMENT  12/29/2023     Silvio Ramos MD  12/29/23 1600

## 2023-12-29 NOTE — ED TRIAGE NOTES
Pt presents after staff at her treatment center  got BP readinngs of 249/129 and 160/120.  Pt c/o frontal headache at 6/10.  B/P in triage was 132/83 on right and 130/84 on left.       Triage Assessment (Adult)       Row Name 12/29/23 0940          Triage Assessment    Airway WDL WDL        Respiratory WDL    Respiratory WDL WDL        Skin Circulation/Temperature WDL    Skin Circulation/Temperature WDL WDL        Cardiac WDL    Cardiac WDL WDL        Peripheral/Neurovascular WDL    Peripheral Neurovascular WDL WDL        Cognitive/Neuro/Behavioral WDL    Cognitive/Neuro/Behavioral WDL WDL

## 2024-01-24 ENCOUNTER — HOSPITAL ENCOUNTER (EMERGENCY)
Facility: CLINIC | Age: 49
Discharge: HOME | End: 2024-01-24
Attending: EMERGENCY MEDICINE | Admitting: EMERGENCY MEDICINE
Payer: MEDICAID

## 2024-01-24 VITALS
DIASTOLIC BLOOD PRESSURE: 86 MMHG | TEMPERATURE: 98.9 F | WEIGHT: 220 LBS | RESPIRATION RATE: 16 BRPM | OXYGEN SATURATION: 99 % | BODY MASS INDEX: 34.53 KG/M2 | SYSTOLIC BLOOD PRESSURE: 155 MMHG | HEIGHT: 67 IN | HEART RATE: 64 BPM

## 2024-01-24 DIAGNOSIS — R45.851 SUICIDAL IDEATION: ICD-10-CM

## 2024-01-24 PROBLEM — F60.3 BORDERLINE PERSONALITY DISORDER (H): Chronic | Status: ACTIVE | Noted: 2024-01-24

## 2024-01-24 PROCEDURE — 99285 EMERGENCY DEPT VISIT HI MDM: CPT

## 2024-01-24 ASSESSMENT — ACTIVITIES OF DAILY LIVING (ADL): ADLS_ACUITY_SCORE: 35

## 2024-01-24 NOTE — DISCHARGE INSTRUCTIONS
1--follow up with treatment program referral  2--follow up with psychiatrist at the [facility] as scheduled  3--look into [facility] for trauma therapy while waiting for therapist at the [facility]  4--call [facility] ([phone]), return to ED or call 911 if feeling unsafe

## 2024-01-24 NOTE — ED TRIAGE NOTES
Pt comes from a rehab center in [place]   Pt got into an argument and got kicked out and now she feels suicidal  Pt called ems her self

## 2024-01-24 NOTE — ED PROVIDER NOTES
History     Chief Complaint:  Suicidal       HPI   Mackenzie Felix is a 48 year old female with history of borderline personality disorder, schizoaffective disorder, depression and anxiety presents with suicidal thoughts.  She notes that she has a plan with either overdosing on her medications or taking fentanyl.  She has had 1 prior attempt over 10 years ago with an overdose.    Patient was homeless for about 3 years and then was arrested and went to half-way for about 1 year with her last use of street drugs back in January 2023.  She then went into treatment on December 27 at [facility].  She got kicked out of treatment for getting in an argument with another resident today.  She had tried calling other treatment facilities as well as a shelter and was unable to get any help.  She notes that she does not have a jacket.  She is not can to be homeless again.  She does not want to be a burden to society.  She is frustrated and upset about being here.  She has been taking her meds as prescribed.  There is been no recent med changes.  She notes there is lots of stressors but the big one that threw her over was getting kicked out of treatment today.  Methamphetamines was her drug of choice but has been sober for over a year.      Independent Historian:    patient    Review of External Notes:  na      Medications:    ACETAMINOPHEN PO  albuterol (PROAIR HFA/PROVENTIL HFA/VENTOLIN HFA) 108 (90 BASE) MCG/ACT Inhaler  albuterol (PROAIR HFA/PROVENTIL HFA/VENTOLIN HFA) 108 (90 BASE) MCG/ACT Inhaler  Benzocaine (ORAJEL MT)  blood glucose monitoring (NO BRAND SPECIFIED) test strip  calcium carbonate (TUMS) 500 MG chewable tablet  clotrimazole (LOTRIMIN) 1 % cream  Cyanocobalamin (B-12) 1000 MCG SUBL  escitalopram (LEXAPRO) 20 MG tablet  escitalopram (LEXAPRO) 20 MG tablet  fluticasone (FLOVENT DISKUS) 250 MCG/BLIST AEPB Inhaler  glucose 4 G CHEW  hydrocortisone (CORTAID) 1 % cream  HYDROXYZINE HCL PO  loratadine (CLARITIN)  "10 MG tablet  MEDROXYPROGESTERONE ACETATE PO  MELATONIN PO  NONFORMULARY  omeprazole (PRILOSEC) 40 MG capsule  omeprazole (PRILOSEC) 40 MG capsule  omeprazole (PRILOSEC) 40 MG DR capsule  order for DME  VITAMIN D, CHOLECALCIFEROL, PO        Past Medical History:    Past Medical History:   Diagnosis Date     Active smoker      Anxiety      Anxiety disorder      Asthma      Bariatric surgery status 09/11/2013     Cervical disc disease      Depressive disorder      Diabetes mellitus (H)      GERD (gastroesophageal reflux disease)      Hiatal hernia      Hypoglycemia      Low back pain      Methamphetamine use (H)      Obesity, morbid, BMI 40.0-49.9 (H)      [place] on CPAP      Sleep apnea      Substance abuse (H)        Past Surgical History:    Past Surgical History:   Procedure Laterality Date     ARTHROSCOPY KNEE Right 1995     GASTRECTOMY LAPAROSCOPIC SLEEVE  09/11/2013    Dr. Bairon Camilo     GI SURGERY      sleeve gastrectomy 2013     TONSILLECTOMY            Physical Exam   Patient Vitals for the past 24 hrs:   BP Temp Temp src Pulse Resp SpO2 Height Weight   01/24/24 1541 (!) 155/86 98.9  F (37.2  C) -- 64 16 99 % 1.702 m (5' 7\") 99.8 kg (220 lb)   01/24/24 1202 (!) 153/91 98  F (36.7  C) Oral 85 16 99 % -- --        Physical Exam  GENERAL: well developed, pleasant  HEAD: atraumatic  EYES: pupils reactive, extraocular muscles intact, conjunctivae normal  ENT:  mucus membranes moist  NECK:  trachea midline, normal range of motion  RESPIRATORY: no tachypnea, breath sounds clear to auscultation   CVS: normal S1/S2, no murmurs, intact distal pulses  ABDOMEN: soft, nontender, nondistention  MUSCULOSKELETAL: no deformities  SKIN: warm and dry, no acute rashes or ulceration  NEURO: GCS 15, cranial nerves intact, alert and oriented x3  PSYCH:  Mood/affect normal        Emergency Department Course       Emergency Department Course & Assessments:    PSS-3      Date and Time Over the past 2 weeks have you felt down, " depressed, or hopeless? Over the past 2 weeks have you had thoughts of killing yourself? Have you ever attempted to kill yourself? When did this last happen? User   01/24/24 1542 yes yes yes more than 6 months ago FA          C-SSRS ([place])      Date and Time Q1 Wished to be Dead (Past Month) Q2 Suicidal Thoughts (Past Month) Q3 Suicidal Thought Method Q4 Suicidal Intent without Specific Plan Q5 Suicide Intent with Specific Plan Q6 Suicide Behavior (Lifetime) Within the Past 3 Months? RETIRED: Level of Risk per Screen Screening Not Complete User   01/24/24 1542 yes yes yes no yes -- -- -- -- FA                    Interventions:  Medications - No data to display     Assessments:      Independent Interpretation (X-rays, CTs, rhythm strip):  None    Consultations/Discussion of Management or Tests:  DEC       Social Determinants of Health affecting care:  Homelessness/Housing Insecurity     Disposition:  The patient was discharged to home.     Impression & Plan    CMS Diagnoses: None          Medical Decision Making:  Patient presents after getting kicked out of a treatment facility after spending time in retirement after a stent of homelessness.  She notes she doesn't want to be homeless again and doesn't even own a jacket.  She was suicidal with a plan.  She was placed on a hold and seen by DEC.  She has now secured a place to live/stay and is no longer suicidal.  DEC and myself agree she is safe for discharge.  She was very focused on living situation and now that has improved.  Resources will be given per DEC.    Critical Care time:  was 0 minutes for this patient excluding procedures.    Diagnosis:    ICD-10-CM    1. Suicidal ideation  R45.851            Discharge Medications:  New Prescriptions    No medications on file          Amol Pickens MD  1/24/2024   No att. providers found              Amol Pickens MD  01/28/24 5380

## 2024-01-24 NOTE — ED NOTES
"Pt has been safety searched. Pt became argumentive during that process stating \" I know all about you guys here, that is why you are being sues- donald you make people do things and treat the like this is [facility] . Fuck you guys and this fucking place- get out of here. \"   "

## 2024-01-25 NOTE — ED NOTES
Pt has been given her papers - she has called for a ride .   Pt has a lot of belongings - staff went to grab them . Pt will wait for ride out front .

## 2024-01-25 NOTE — CONSULTS
"Diagnostic Evaluation Consultation  Crisis Assessment    Patient Name: Mackenzie Felix  Age:  48 year old  Legal Sex: female  Gender Identity: female  Pronouns:   Race: White  Ethnicity: Not  or   Language: English      Patient was assessed: In person      Patient location: [facility] EMERGENCY DEPT                                 Referral Data and Chief Complaint  Mackenzie Felix presents to the ED via EMS (Pt called 911 this morning after being discharged from treatment program for fighting with roommate.). Patient is presenting to the ED for the following concerns: Suicidal ideation, Worsening psychosocial stress (lack of shelter/housing).   Factors that make the mental health crisis life threatening or complex are:  Pt has been in residential treatment for a month, after experiencing homelessness. She was discharged from the program today for fighting with her roommate. She experienced emotional dysregulation and suicidal ideation when faced with nowhere to go this morning, resulting in her calling 911. Pt acknowledges being quite upset earlier in the ED, but maintains she has had time to calm down and contact friends who have offered her two places to stay. She states she has an appointment scheduled with her psychiatrist at [facility] and she is on the wait list for a therapist there. Her provider also referred her to [facility] for trauma therapy which Pt plans to follow up on. She states she has a recommendation for another treatment program that she will contact. She denies current SI, plan or intent \"If I was going to do it, I would not come here and wait 4 and half hours in the waiting room. I would have just done it\"..      Informed Consent and Assessment Methods  Explained the crisis assessment process, including applicable information disclosures and limits to confidentiality, assessed understanding of the process, and obtained consent to proceed with the " assessment.  Assessment methods included conducting a formal interview with patient, review of medical records, collaboration with medical staff, and obtaining relevant collateral information from family and community providers when available.  : done     Patient response to interventions: acceptance expressed  Coping skills were attempted to reduce the crisis:  Pt independently reached out to support system and utilized knowledge of services and systems to formulate own care plan     History of the Crisis   Pt has history of history of borderline personality disorder, schizoaffective disorder, depression and anxiety as well as substance abuse. She reports being sober for a year and moved into a residential treatment program December 27, 2023 after being homeless for 3 years and in [facility] for 1 year.    Brief Psychosocial History  Family:  Single, Children no  Support System:  Other (specify) (friends)  Employment Status:  unemployed  Source of Income:  unable to assess  Financial Environmental Concerns:  unable to afford rent/mortgage, unemployed, other (see comments) (lost housing suddenly when discharged from treatment)  Current Hobbies:     Barriers in Personal Life:       Significant Clinical History  Current Anxiety Symptoms:   (none reported)  Current Depression/Trauma:  impaired decision making, irritable  Current Somatic Symptoms:   (none reported)  Current Psychosis/Thought Disturbance:  impulsive, displaces blame  Current Eating Symptoms:   (none reported)  Chemical Use History:  Other Use: Methamphetamines (history of abuse, sober for 1 year)   Past diagnosis:  Anxiety Disorder, Depression, Personality Disorder, Substance Use Disorder  Family history:  No known history of mental health or chemical health concerns  Past treatment:  Individual therapy, Psychiatric Medication Management, Residential Treatment  Details of most recent treatment:  December 27, 2023 to today residential treatment  Other  relevant history:  homeless 3 years, in [facility] 1 year       Collateral Information  Is there collateral information: No (none available)     Collateral information name, relationship, phone number:       What happened today:       What is different about patient's functioning:       Concern about alcohol/drug use:      What do you think the patient needs:      Has patient made comments about wanting to kill themselves/others:      If d/c is recommended, can they take part in safety/aftercare planning:       Additional collateral information:        Risk Assessment  [place] Suicide Severity Rating Scale Full Clinical Version:  Suicidal Ideation  Q1 Wish to be Dead (Lifetime): Yes  Q2 Non-Specific Active Suicidal Thoughts (Lifetime): Yes  3. Active Suicidal Ideation with any Methods (Not Plan) Without Intent to Act (Lifetime): Yes  Q4 Active Suicidal Ideation with Some Intent to Act, Without Specific Plan (Lifetime): Yes  Q5 Active Suicidal Ideation with Specific Plan and Intent (Lifetime): No  Q6 Suicide Behavior (Lifetime): yes     Suicidal Behavior (Lifetime)  Actual Attempt (Lifetime): Yes  Total Number of Actual Attempts (Lifetime): 1  Actual Attempt Description (Lifetime): overdose 10 years ago  Has subject engaged in non-suicidal self-injurious behavior? (Lifetime): No  Interrupted Attempts (Lifetime): No  Aborted or Self-Interrupted Attempt (Lifetime): No  Preparatory Acts or Behavior (Lifetime): No    [place] Suicide Severity Rating Scale Recent:   Suicidal Ideation (Recent)  Q1 Wished to be Dead (Past Month): yes  Q2 Suicidal Thoughts (Past Month): yes  Q3 Suicidal Thought Method: yes  Q4 Suicidal Intent without Specific Plan: no  Q5 Suicide Intent with Specific Plan: yes  Level of Risk per Screen: high risk          Environmental or Psychosocial Events: unstable housing, homelessness, other (see comment) (discharged suddenly from residential program due to fighting)  Protective Factors: Protective  Factors: good treatment engagement, supportive ongoing medical and mental health care relationships, help seeking    Does the patient have thoughts of harming others? Feels Like Hurting Others:  (denies)  Previous Attempt to Hurt Others: no  Current presentation:  (calm and cooperative now but earlier in ED was verbally abusive/threatening)  Is the patient engaging in sexually inappropriate behavior?: no    Is the patient engaging in sexually inappropriate behavior?  no        Mental Status Exam   Affect: Appropriate  Appearance: Appropriate  Attention Span/Concentration: Attentive  Eye Contact: Variable    Fund of Knowledge: Appropriate   Language /Speech Content: Fluent  Language /Speech Volume: Normal  Language /Speech Rate/Productions: Normal  Recent Memory: Intact  Remote Memory: Intact  Mood: Irritable, Normal  Orientation to Person: Yes   Orientation to Place: Yes  Orientation to Time of Day: Yes  Orientation to Date: Yes     Situation (Do they understand why they are here?): Yes  Psychomotor Behavior: Normal  Thought Content: Clear  Thought Form: Goal Directed, Intact, Other (please comment) (future oriented, goal to stay with friends until able to get into another treatment program,)     Mini-Cog Assessment  Number of Words Recalled:    Clock-Drawing Test:     Three Item Recall:    Mini-Cog Total Score:       Medication  Psychotropic medications:   Medication Orders - Psychiatric (From admission, onward)      None             Current Care Team  Patient Care Team:  Rachel Chen MD as PCP - General (Student in organized health care education/training program)  Donte Meraz DO (Student in organized health care education/training program)    Diagnosis  Patient Active Problem List   Diagnosis Code    Hypoglycemia E16.2    Attention deficit disorder F98.8    Anxiety F41.9    Bariatric surgery status Z98.84    Obstructive sleep apnea syndrome G47.33    Postsurgical malabsorption, not elsewhere classified  "K91.2    Patellofemoral arthralgia of right knee M25.561    Mild persistent allergic asthma with acute exacerbation J45.31    Borderline personality disorder (H) F60.3    Suicidal ideation R45.851       Primary Problem This Admission  Active Hospital Problems    Borderline personality disorder (H)      Suicidal ideation        Clinical Summary and Substantiation of Recommendations   Pt has been in residential treatment for a month, after experiencing homelessness. She was discharged from the program today for fighting with her roommate. She experienced emotional dysregulation and suicidal ideation when faced with nowhere to go this morning, resulting in her calling 911. Pt acknowledges being quite upset earlier in the ED, but maintains she has had time to calm down and contact friends who have offered her two places to stay. She states she has an appointment scheduled with her psychiatrist at [facility] and she is on the wait list for a therapist there. Her provider also referred her to [facility] for trauma therapy which Pt plans to follow up on. She states she has a recommendation for another treatment program that she will contact. She denies current SI, plan or intent \"If I was going to do it, I would not come here and wait 4 and half hours in the waiting room. I would have just done it\". Pt declined [facility], stating she wants to discharge and stay with friend tonight so she can follow up with established providers. [facility] and MD concur on plan for Pt to discharge with instructions to follow up with current provider and referrals                          Patient coping skills attempted to reduce the crisis:  Pt independently reached out to support system and utilized knowledge of services and systems to formulate own care plan    Disposition  Recommended disposition: Individual Therapy, Medication Management, Substance Abuse Disorder Treatment        Reviewed case and recommendations with attending provider. " Attending Name: Dr Amol Pickens       Attending concurs with disposition: yes       Patient and/or validated legal guardian concurs with disposition:   yes       Final disposition:  discharge    Legal status on admission:      Assessment Details   Total duration spent with the patient: 30 min     CPT code(s) utilized: 50927 - Psychotherapy for Crisis - 60 (30-74*) min    Lachelle Melvin LP, Psychotherapist  [facility] - Triage & Transition Services  Callback: [phone]

## 2024-06-02 ENCOUNTER — HEALTH MAINTENANCE LETTER (OUTPATIENT)
Age: 49
End: 2024-06-02

## 2024-10-28 ENCOUNTER — TRANSFERRED RECORDS (OUTPATIENT)
Dept: HEALTH INFORMATION MANAGEMENT | Facility: CLINIC | Age: 49
End: 2024-10-28
Payer: MEDICAID

## 2024-11-15 ENCOUNTER — TRANSFERRED RECORDS (OUTPATIENT)
Dept: HEALTH INFORMATION MANAGEMENT | Facility: CLINIC | Age: 49
End: 2024-11-15
Payer: MEDICAID

## 2025-04-13 ENCOUNTER — HEALTH MAINTENANCE LETTER (OUTPATIENT)
Age: 50
End: 2025-04-13

## 2025-06-15 ENCOUNTER — HEALTH MAINTENANCE LETTER (OUTPATIENT)
Age: 50
End: 2025-06-15